# Patient Record
Sex: MALE | Race: WHITE | HISPANIC OR LATINO | Employment: FULL TIME | URBAN - METROPOLITAN AREA
[De-identification: names, ages, dates, MRNs, and addresses within clinical notes are randomized per-mention and may not be internally consistent; named-entity substitution may affect disease eponyms.]

---

## 2024-09-23 ENCOUNTER — APPOINTMENT (OUTPATIENT)
Dept: CT IMAGING | Facility: HOSPITAL | Age: 54
End: 2024-09-23
Payer: COMMERCIAL

## 2024-09-23 ENCOUNTER — HOSPITAL ENCOUNTER (INPATIENT)
Facility: HOSPITAL | Age: 54
LOS: 2 days | Discharge: HOME | End: 2024-09-25
Attending: EMERGENCY MEDICINE | Admitting: HOSPITALIST
Payer: COMMERCIAL

## 2024-09-23 DIAGNOSIS — E87.6 HYPOKALEMIA: ICD-10-CM

## 2024-09-23 DIAGNOSIS — Z91.89 AT RISK FOR OBSTRUCTIVE SLEEP APNEA: ICD-10-CM

## 2024-09-23 DIAGNOSIS — R55 NEAR SYNCOPE: Primary | ICD-10-CM

## 2024-09-23 DIAGNOSIS — E87.1 HYPONATREMIA: ICD-10-CM

## 2024-09-23 LAB
ADV 40+41 DNA STL QL NAA+NON-PROBE: NOT DETECTED
ALBUMIN SERPL-MCNC: 3.6 G/DL (ref 3.5–5.2)
ALBUMIN SERPL-MCNC: 3.9 G/DL (ref 3.5–5.2)
ALBUMIN/GLOB SERPL: 1.7 G/DL
ALP SERPL-CCNC: 53 U/L (ref 39–117)
ALT SERPL W P-5'-P-CCNC: 20 U/L (ref 1–41)
ANION GAP SERPL CALCULATED.3IONS-SCNC: 11 MMOL/L (ref 5–15)
ANION GAP SERPL CALCULATED.3IONS-SCNC: 9.7 MMOL/L (ref 5–15)
AST SERPL-CCNC: 27 U/L (ref 1–40)
ASTRO TYP 1-8 RNA STL QL NAA+NON-PROBE: NOT DETECTED
BASOPHILS # BLD AUTO: 0.06 10*3/MM3 (ref 0–0.2)
BASOPHILS NFR BLD AUTO: 0.6 % (ref 0–1.5)
BILIRUB SERPL-MCNC: 0.6 MG/DL (ref 0–1.2)
BILIRUB UR QL STRIP: NEGATIVE
BUN SERPL-MCNC: 14 MG/DL (ref 6–20)
BUN SERPL-MCNC: 14 MG/DL (ref 6–20)
BUN/CREAT SERPL: 11.6 (ref 7–25)
BUN/CREAT SERPL: 8.7 (ref 7–25)
C CAYETANENSIS DNA STL QL NAA+NON-PROBE: NOT DETECTED
C COLI+JEJ+UPSA DNA STL QL NAA+NON-PROBE: NOT DETECTED
CALCIUM SPEC-SCNC: 8.6 MG/DL (ref 8.6–10.5)
CALCIUM SPEC-SCNC: 9 MG/DL (ref 8.6–10.5)
CHLORIDE SERPL-SCNC: 82 MMOL/L (ref 98–107)
CHLORIDE SERPL-SCNC: 85 MMOL/L (ref 98–107)
CHLORIDE UR-SCNC: 24 MMOL/L
CLARITY UR: CLEAR
CO2 SERPL-SCNC: 26 MMOL/L (ref 22–29)
CO2 SERPL-SCNC: 27.3 MMOL/L (ref 22–29)
COLOR UR: YELLOW
CORTIS SERPL-MCNC: 17.3 MCG/DL
CREAT SERPL-MCNC: 1.21 MG/DL (ref 0.76–1.27)
CREAT SERPL-MCNC: 1.61 MG/DL (ref 0.76–1.27)
CREAT UR-MCNC: 31.4 MG/DL
CRYPTOSP DNA STL QL NAA+NON-PROBE: NOT DETECTED
DEPRECATED RDW RBC AUTO: 43 FL (ref 37–54)
E HISTOLYT DNA STL QL NAA+NON-PROBE: NOT DETECTED
EAEC PAA PLAS AGGR+AATA ST NAA+NON-PRB: NOT DETECTED
EC STX1+STX2 GENES STL QL NAA+NON-PROBE: NOT DETECTED
EGFRCR SERPLBLD CKD-EPI 2021: 49.9 ML/MIN/1.73
EGFRCR SERPLBLD CKD-EPI 2021: 70.3 ML/MIN/1.73
EOSINOPHIL # BLD AUTO: 0.09 10*3/MM3 (ref 0–0.4)
EOSINOPHIL NFR BLD AUTO: 0.9 % (ref 0.3–6.2)
EPEC EAE GENE STL QL NAA+NON-PROBE: NOT DETECTED
ERYTHROCYTE [DISTWIDTH] IN BLOOD BY AUTOMATED COUNT: 12.6 % (ref 12.3–15.4)
ETEC LTA+ST1A+ST1B TOX ST NAA+NON-PROBE: NOT DETECTED
G LAMBLIA DNA STL QL NAA+NON-PROBE: NOT DETECTED
GLOBULIN UR ELPH-MCNC: 2.1 GM/DL
GLUCOSE SERPL-MCNC: 105 MG/DL (ref 65–99)
GLUCOSE SERPL-MCNC: 117 MG/DL (ref 65–99)
GLUCOSE UR STRIP-MCNC: NEGATIVE MG/DL
HCT VFR BLD AUTO: 43.8 % (ref 37.5–51)
HGB BLD-MCNC: 15.2 G/DL (ref 13–17.7)
HGB UR QL STRIP.AUTO: NEGATIVE
IMM GRANULOCYTES # BLD AUTO: 0.04 10*3/MM3 (ref 0–0.05)
IMM GRANULOCYTES NFR BLD AUTO: 0.4 % (ref 0–0.5)
KETONES UR QL STRIP: NEGATIVE
LEUKOCYTE ESTERASE UR QL STRIP.AUTO: NEGATIVE
LYMPHOCYTES # BLD AUTO: 1.21 10*3/MM3 (ref 0.7–3.1)
LYMPHOCYTES NFR BLD AUTO: 12.2 % (ref 19.6–45.3)
MAGNESIUM SERPL-MCNC: 2.2 MG/DL (ref 1.6–2.6)
MCH RBC QN AUTO: 32.1 PG (ref 26.6–33)
MCHC RBC AUTO-ENTMCNC: 34.7 G/DL (ref 31.5–35.7)
MCV RBC AUTO: 92.4 FL (ref 79–97)
MONOCYTES # BLD AUTO: 0.95 10*3/MM3 (ref 0.1–0.9)
MONOCYTES NFR BLD AUTO: 9.6 % (ref 5–12)
NEUTROPHILS NFR BLD AUTO: 7.59 10*3/MM3 (ref 1.7–7)
NEUTROPHILS NFR BLD AUTO: 76.3 % (ref 42.7–76)
NITRITE UR QL STRIP: NEGATIVE
NOROVIRUS GI+II RNA STL QL NAA+NON-PROBE: NOT DETECTED
NRBC BLD AUTO-RTO: 0 /100 WBC (ref 0–0.2)
OSMOLALITY SERPL: 253 MOSM/KG (ref 275–300)
OSMOLALITY UR: 143 MOSM/KG
P SHIGELLOIDES DNA STL QL NAA+NON-PROBE: NOT DETECTED
PH UR STRIP.AUTO: 7.5 [PH] (ref 5–8)
PHOSPHATE SERPL-MCNC: 3.3 MG/DL (ref 2.5–4.5)
PHOSPHATE SERPL-MCNC: 3.4 MG/DL (ref 2.5–4.5)
PLATELET # BLD AUTO: 226 10*3/MM3 (ref 140–450)
PMV BLD AUTO: 9.2 FL (ref 6–12)
POTASSIUM SERPL-SCNC: 2.7 MMOL/L (ref 3.5–5.2)
POTASSIUM SERPL-SCNC: 3 MMOL/L (ref 3.5–5.2)
POTASSIUM UR-SCNC: 16.1 MMOL/L
PROT ?TM UR-MCNC: 5.8 MG/DL
PROT SERPL-MCNC: 5.7 G/DL (ref 6–8.5)
PROT UR QL STRIP: NEGATIVE
PROT/CREAT UR: 184.7 MG/G CREA (ref 0–200)
QT INTERVAL: 434 MS
QTC INTERVAL: 485 MS
RBC # BLD AUTO: 4.74 10*6/MM3 (ref 4.14–5.8)
RVA RNA STL QL NAA+NON-PROBE: NOT DETECTED
S ENT+BONG DNA STL QL NAA+NON-PROBE: NOT DETECTED
SAPO I+II+IV+V RNA STL QL NAA+NON-PROBE: NOT DETECTED
SHIGELLA SP+EIEC IPAH ST NAA+NON-PROBE: NOT DETECTED
SODIUM SERPL-SCNC: 119 MMOL/L (ref 136–145)
SODIUM SERPL-SCNC: 122 MMOL/L (ref 136–145)
SODIUM UR-SCNC: 22 MMOL/L
SP GR UR STRIP: 1.01 (ref 1–1.03)
TROPONIN T SERPL HS-MCNC: 23 NG/L
TSH SERPL DL<=0.05 MIU/L-ACNC: 2.37 UIU/ML (ref 0.27–4.2)
URATE SERPL-MCNC: 4.6 MG/DL (ref 3.4–7)
UROBILINOGEN UR QL STRIP: NORMAL
V CHOL+PARA+VUL DNA STL QL NAA+NON-PROBE: NOT DETECTED
V CHOLERAE DNA STL QL NAA+NON-PROBE: NOT DETECTED
WBC NRBC COR # BLD AUTO: 9.94 10*3/MM3 (ref 3.4–10.8)
Y ENTEROCOL DNA STL QL NAA+NON-PROBE: NOT DETECTED

## 2024-09-23 PROCEDURE — 25810000003 SODIUM CHLORIDE 0.9 % SOLUTION: Performed by: EMERGENCY MEDICINE

## 2024-09-23 PROCEDURE — 74177 CT ABD & PELVIS W/CONTRAST: CPT

## 2024-09-23 PROCEDURE — 84133 ASSAY OF URINE POTASSIUM: CPT | Performed by: INTERNAL MEDICINE

## 2024-09-23 PROCEDURE — 93005 ELECTROCARDIOGRAM TRACING: CPT | Performed by: EMERGENCY MEDICINE

## 2024-09-23 PROCEDURE — 82533 TOTAL CORTISOL: CPT | Performed by: INTERNAL MEDICINE

## 2024-09-23 PROCEDURE — 84156 ASSAY OF PROTEIN URINE: CPT | Performed by: INTERNAL MEDICINE

## 2024-09-23 PROCEDURE — 36415 COLL VENOUS BLD VENIPUNCTURE: CPT | Performed by: INTERNAL MEDICINE

## 2024-09-23 PROCEDURE — 80050 GENERAL HEALTH PANEL: CPT | Performed by: EMERGENCY MEDICINE

## 2024-09-23 PROCEDURE — 81003 URINALYSIS AUTO W/O SCOPE: CPT | Performed by: INTERNAL MEDICINE

## 2024-09-23 PROCEDURE — 93010 ELECTROCARDIOGRAM REPORT: CPT | Performed by: INTERNAL MEDICINE

## 2024-09-23 PROCEDURE — 83930 ASSAY OF BLOOD OSMOLALITY: CPT | Performed by: INTERNAL MEDICINE

## 2024-09-23 PROCEDURE — 70450 CT HEAD/BRAIN W/O DYE: CPT

## 2024-09-23 PROCEDURE — 84300 ASSAY OF URINE SODIUM: CPT | Performed by: INTERNAL MEDICINE

## 2024-09-23 PROCEDURE — 87507 IADNA-DNA/RNA PROBE TQ 12-25: CPT | Performed by: EMERGENCY MEDICINE

## 2024-09-23 PROCEDURE — 84484 ASSAY OF TROPONIN QUANT: CPT | Performed by: EMERGENCY MEDICINE

## 2024-09-23 PROCEDURE — 83935 ASSAY OF URINE OSMOLALITY: CPT | Performed by: INTERNAL MEDICINE

## 2024-09-23 PROCEDURE — 80069 RENAL FUNCTION PANEL: CPT | Performed by: INTERNAL MEDICINE

## 2024-09-23 PROCEDURE — 84100 ASSAY OF PHOSPHORUS: CPT | Performed by: HOSPITALIST

## 2024-09-23 PROCEDURE — 25510000001 IOPAMIDOL 61 % SOLUTION: Performed by: EMERGENCY MEDICINE

## 2024-09-23 PROCEDURE — 83735 ASSAY OF MAGNESIUM: CPT | Performed by: EMERGENCY MEDICINE

## 2024-09-23 PROCEDURE — 99285 EMERGENCY DEPT VISIT HI MDM: CPT

## 2024-09-23 PROCEDURE — 84550 ASSAY OF BLOOD/URIC ACID: CPT | Performed by: INTERNAL MEDICINE

## 2024-09-23 PROCEDURE — 82570 ASSAY OF URINE CREATININE: CPT | Performed by: INTERNAL MEDICINE

## 2024-09-23 PROCEDURE — 82436 ASSAY OF URINE CHLORIDE: CPT | Performed by: INTERNAL MEDICINE

## 2024-09-23 RX ORDER — SODIUM CHLORIDE 0.9 % (FLUSH) 0.9 %
10 SYRINGE (ML) INJECTION EVERY 12 HOURS SCHEDULED
Status: DISCONTINUED | OUTPATIENT
Start: 2024-09-23 | End: 2024-09-25 | Stop reason: HOSPADM

## 2024-09-23 RX ORDER — POTASSIUM CHLORIDE 750 MG/1
40 TABLET, FILM COATED, EXTENDED RELEASE ORAL EVERY 4 HOURS
Status: COMPLETED | OUTPATIENT
Start: 2024-09-23 | End: 2024-09-23

## 2024-09-23 RX ORDER — SODIUM CHLORIDE 0.9 % (FLUSH) 0.9 %
10 SYRINGE (ML) INJECTION AS NEEDED
Status: DISCONTINUED | OUTPATIENT
Start: 2024-09-23 | End: 2024-09-25 | Stop reason: HOSPADM

## 2024-09-23 RX ORDER — CLONIDINE HYDROCHLORIDE 0.1 MG/1
0.1 TABLET ORAL 2 TIMES DAILY
COMMUNITY
End: 2024-09-25 | Stop reason: HOSPADM

## 2024-09-23 RX ORDER — SODIUM CHLORIDE 9 MG/ML
40 INJECTION, SOLUTION INTRAVENOUS AS NEEDED
Status: DISCONTINUED | OUTPATIENT
Start: 2024-09-23 | End: 2024-09-25 | Stop reason: HOSPADM

## 2024-09-23 RX ORDER — LISINOPRIL 20 MG/1
20 TABLET ORAL DAILY
Status: DISCONTINUED | OUTPATIENT
Start: 2024-09-23 | End: 2024-09-23

## 2024-09-23 RX ORDER — NITROGLYCERIN 0.4 MG/1
0.4 TABLET SUBLINGUAL
Status: DISCONTINUED | OUTPATIENT
Start: 2024-09-23 | End: 2024-09-25 | Stop reason: HOSPADM

## 2024-09-23 RX ORDER — ACETAMINOPHEN 160 MG/5ML
650 SOLUTION ORAL EVERY 4 HOURS PRN
Status: DISCONTINUED | OUTPATIENT
Start: 2024-09-23 | End: 2024-09-25 | Stop reason: HOSPADM

## 2024-09-23 RX ORDER — ONDANSETRON 2 MG/ML
4 INJECTION INTRAMUSCULAR; INTRAVENOUS EVERY 6 HOURS PRN
Status: DISCONTINUED | OUTPATIENT
Start: 2024-09-23 | End: 2024-09-25 | Stop reason: HOSPADM

## 2024-09-23 RX ORDER — ACETAMINOPHEN 650 MG/1
650 SUPPOSITORY RECTAL EVERY 4 HOURS PRN
Status: DISCONTINUED | OUTPATIENT
Start: 2024-09-23 | End: 2024-09-25 | Stop reason: HOSPADM

## 2024-09-23 RX ORDER — IOPAMIDOL 612 MG/ML
100 INJECTION, SOLUTION INTRAVASCULAR
Status: COMPLETED | OUTPATIENT
Start: 2024-09-23 | End: 2024-09-23

## 2024-09-23 RX ORDER — NICOTINE 21 MG/24HR
1 PATCH, TRANSDERMAL 24 HOURS TRANSDERMAL
Status: DISCONTINUED | OUTPATIENT
Start: 2024-09-23 | End: 2024-09-25 | Stop reason: HOSPADM

## 2024-09-23 RX ORDER — CARVEDILOL 12.5 MG/1
12.5 TABLET ORAL 2 TIMES DAILY
Status: DISCONTINUED | OUTPATIENT
Start: 2024-09-23 | End: 2024-09-25 | Stop reason: HOSPADM

## 2024-09-23 RX ORDER — CARVEDILOL 12.5 MG/1
12.5 TABLET ORAL 2 TIMES DAILY
COMMUNITY

## 2024-09-23 RX ORDER — CLONIDINE HYDROCHLORIDE 0.1 MG/1
0.1 TABLET ORAL 2 TIMES DAILY
Status: DISCONTINUED | OUTPATIENT
Start: 2024-09-23 | End: 2024-09-24

## 2024-09-23 RX ORDER — ONDANSETRON 4 MG/1
4 TABLET, ORALLY DISINTEGRATING ORAL EVERY 6 HOURS PRN
Status: DISCONTINUED | OUTPATIENT
Start: 2024-09-23 | End: 2024-09-25 | Stop reason: HOSPADM

## 2024-09-23 RX ORDER — LISINOPRIL 20 MG/1
20 TABLET ORAL DAILY
COMMUNITY
End: 2024-09-25 | Stop reason: HOSPADM

## 2024-09-23 RX ORDER — CHLORTHALIDONE 25 MG/1
25 TABLET ORAL DAILY
COMMUNITY
End: 2024-09-25 | Stop reason: HOSPADM

## 2024-09-23 RX ORDER — ACETAMINOPHEN 325 MG/1
650 TABLET ORAL EVERY 4 HOURS PRN
Status: DISCONTINUED | OUTPATIENT
Start: 2024-09-23 | End: 2024-09-25 | Stop reason: HOSPADM

## 2024-09-23 RX ORDER — POTASSIUM CHLORIDE 1500 MG/1
20 TABLET, EXTENDED RELEASE ORAL 2 TIMES DAILY
COMMUNITY
End: 2024-09-25 | Stop reason: HOSPADM

## 2024-09-23 RX ORDER — SODIUM CHLORIDE 9 MG/ML
75 INJECTION, SOLUTION INTRAVENOUS CONTINUOUS
Status: DISCONTINUED | OUTPATIENT
Start: 2024-09-23 | End: 2024-09-24

## 2024-09-23 RX ADMIN — POTASSIUM CHLORIDE 40 MEQ: 750 TABLET, EXTENDED RELEASE ORAL at 13:04

## 2024-09-23 RX ADMIN — NICOTINE 1 PATCH: 14 PATCH TRANSDERMAL at 21:15

## 2024-09-23 RX ADMIN — POTASSIUM CHLORIDE 40 MEQ: 750 TABLET, EXTENDED RELEASE ORAL at 21:15

## 2024-09-23 RX ADMIN — IOPAMIDOL 85 ML: 612 INJECTION, SOLUTION INTRAVENOUS at 12:42

## 2024-09-23 RX ADMIN — CARVEDILOL 12.5 MG: 12.5 TABLET, FILM COATED ORAL at 21:16

## 2024-09-23 RX ADMIN — ACETAMINOPHEN 325MG 650 MG: 325 TABLET ORAL at 18:23

## 2024-09-23 RX ADMIN — POTASSIUM CHLORIDE 40 MEQ: 750 TABLET, EXTENDED RELEASE ORAL at 18:22

## 2024-09-23 RX ADMIN — SODIUM CHLORIDE 1000 ML: 9 INJECTION, SOLUTION INTRAVENOUS at 12:12

## 2024-09-23 RX ADMIN — CLONIDINE HYDROCHLORIDE 0.1 MG: 0.1 TABLET ORAL at 21:15

## 2024-09-23 RX ADMIN — SODIUM CHLORIDE 75 ML/HR: 9 INJECTION, SOLUTION INTRAVENOUS at 18:26

## 2024-09-23 RX ADMIN — Medication 10 ML: at 21:16

## 2024-09-23 NOTE — H&P
HISTORY AND PHYSICAL   Psychiatric        Patient Identification:  Name: Vadim Elder  Age: 54 y.o.  Sex: male  :  1970  MRN: [id number]                     Primary Care Physician: Shiva Gutierrez MD    Chief Complaint: Lightheaded    History of Present Illness:   Pleasant 54-year-old gentleman with history of hypertension for which she has been taking Hygroton apparently for a number of years now.  He works in air conditioning.  He was feeling lightheaded at work today and EMS was called.  He was hypotensive on presentation.  He does note that he has been having loose stools and he had increased his oral intake.  Workup revealed significant hyponatremia as well as hypokalemia.  Available records indicate that he has had chronic hypokalemia though not to this severity.  In addition there have been episodes of hyponatremia in the past.  Presently feeling better after IV fluids.        Past Medical History:  History reviewed. No pertinent past medical history.  Past Surgical History:  History reviewed. No pertinent surgical history.   Home Meds:  Medications Prior to Admission   Medication Sig Dispense Refill Last Dose    carvedilol (COREG) 12.5 MG tablet Take 1 tablet by mouth 2 (Two) Times a Day.       chlorthalidone (HYGROTON) 25 MG tablet Take 1 tablet by mouth Daily.       cloNIDine (CATAPRES) 0.1 MG tablet Take 1 tablet by mouth 2 (Two) Times a Day.       lisinopril (PRINIVIL,ZESTRIL) 20 MG tablet Take 1 tablet by mouth Daily.       potassium chloride ER (K-TAB) 20 MEQ tablet controlled-release ER tablet Take 1 tablet by mouth 2 (Two) Times a Day.          Allergies:  No Known Allergies  Immunizations:    There is no immunization history on file for this patient.  Social History:   Social History     Social History Narrative    Not on file     Social History     Tobacco Use    Smoking status: Every Day     Current packs/day: 0.50     Average packs/day: 0.5 packs/day for 52.7 years (26.4  ttl pk-yrs)     Types: Cigarettes     Start date:     Smokeless tobacco: Never   Substance Use Topics    Alcohol use: Yes     Alcohol/week: 12.0 standard drinks of alcohol     Types: 12 Cans of beer per week     Comment: 2 beer/day     Family History:  History reviewed. No pertinent family history.     Review of Systems  Review of Systems   Constitutional:         As per history of present illness       Objective:  T Max 24 hrs: Temp (24hrs), Av.9 °F (36.6 °C), Min:97.3 °F (36.3 °C), Max:98.5 °F (36.9 °C)    Vitals Ranges:   Temp:  [97.3 °F (36.3 °C)-98.5 °F (36.9 °C)] 97.3 °F (36.3 °C)  Heart Rate:  [66-78] 74  Resp:  [16] 16  BP: (105-167)/(55-94) 167/85      Exam:  Physical Exam  Constitutional:       General: He is not in acute distress.     Appearance: Normal appearance. He is normal weight. He is not ill-appearing, toxic-appearing or diaphoretic.   HENT:      Head: Normocephalic and atraumatic.      Right Ear: External ear normal.      Left Ear: External ear normal.      Nose: Nose normal.      Mouth/Throat:      Mouth: Mucous membranes are moist.   Eyes:      General: No scleral icterus.        Right eye: No discharge.         Left eye: No discharge.      Extraocular Movements: Extraocular movements intact.      Conjunctiva/sclera: Conjunctivae normal.   Cardiovascular:      Rate and Rhythm: Normal rate and regular rhythm.      Heart sounds:      No friction rub. No gallop.   Pulmonary:      Effort: Pulmonary effort is normal.      Breath sounds: Normal breath sounds.   Abdominal:      General: Abdomen is flat. Bowel sounds are normal. There is no distension.      Palpations: Abdomen is soft. There is no mass.      Tenderness: There is no abdominal tenderness. There is no guarding or rebound.   Musculoskeletal:      Cervical back: Neck supple.      Right lower leg: No edema.      Left lower leg: No edema.   Skin:     General: Skin is warm and dry.   Neurological:      General: No focal deficit  present.      Mental Status: He is alert and oriented to person, place, and time.   Psychiatric:         Mood and Affect: Mood normal.         Behavior: Behavior normal.         Thought Content: Thought content normal.         Judgment: Judgment normal.         Data Review:  All labs and radiology reviewed.    Assessment:  Hyponatremia: Associated with the use of thiazide diuretics as well as increased free fluid intake.  Nephrology input appreciated.  Continue hydration and hold Hygroton.  Recommend looking to change this to a different antihypertensive.  Hypokalemia: Continue replacement protocol.  Keep Hygroton on hold.  JEFFREY/dehydration: Already showing good response to IV hydration.  Hold Hygroton.  Continue hydration.  Hypertension: Resume home meds with exception of Hygroton.  Lightheadedness: Secondary to hypotension secondary to antihypertensives and dehydration.  Resolved.  Recent history of loose stools: Monitor.  If persist consider GI panel.      Plan:  Please see above.  Discussed with ER provider.  Considering the size of his bladder on CT scan will also check postvoid residual.    Ralph Dela Cruz MD  9/23/2024  17:57 EDT    EMR Dragon/Transcription disclaimer:   Much of this encounter note is an electronic transcription/translation of spoken language to printed text. The electronic translation of spoken language may permit erroneous, or at times, nonsensical words or phrases to be inadvertently transcribed; Although I have reviewed the note for such errors, some may still exist.

## 2024-09-23 NOTE — ED NOTES
..Nursing report ED to floor  Vadim Elder  54 y.o.  male    HPI :  HPI (Adult)  Stated Reason for Visit: syncopal episode at work, denies hitting head, no thinners, low BP upon ems arrival 80s systolic  History Obtained From: EMS    Chief Complaint  Chief Complaint   Patient presents with    Syncope       Admitting doctor:   Ralph Dela Cruz MD    Admitting diagnosis:   The primary encounter diagnosis was Near syncope. Diagnoses of Hyponatremia and Hypokalemia were also pertinent to this visit.    Code status:   Current Code Status       Date Active Code Status Order ID Comments User Context       Not on file            Allergies:   Patient has no known allergies.    Isolation:   No active isolations    Intake and Output    Intake/Output Summary (Last 24 hours) at 9/23/2024 1443  Last data filed at 9/23/2024 1443  Gross per 24 hour   Intake 1700 ml   Output 400 ml   Net 1300 ml       Weight:       09/23/24  1259   Weight: 92.5 kg (204 lb)       Most recent vitals:   Vitals:    09/23/24 1308 09/23/24 1309 09/23/24 1331 09/23/24 1431   BP: 143/86  155/86 155/94   BP Location:       Pulse: 73 73 66 78   Resp:       Temp:       TempSrc:       SpO2: 99% 98% 97% 94%   Weight:       Height:           Active LDAs/IV Access:   Lines, Drains & Airways       Active LDAs       Name Placement date Placement time Site Days    Peripheral IV 09/23/24 1044 Right Antecubital 09/23/24  1044  Antecubital  less than 1                    Labs (abnormal labs have a star):   Labs Reviewed   COMPREHENSIVE METABOLIC PANEL - Abnormal; Notable for the following components:       Result Value    Glucose 117 (*)     Creatinine 1.61 (*)     Sodium 119 (*)     Potassium 2.7 (*)     Chloride 82 (*)     Total Protein 5.7 (*)     eGFR 49.9 (*)     All other components within normal limits    Narrative:     GFR Normal >60  Chronic Kidney Disease <60  Kidney Failure <15     SINGLE HS TROPONIN T - Abnormal; Notable for the following components:     HS Troponin T 23 (*)     All other components within normal limits    Narrative:     High Sensitive Troponin T Reference Range:  <14.0 ng/L- Negative Female for AMI  <22.0 ng/L- Negative Male for AMI  >=14 - Abnormal Female indicating possible myocardial injury.  >=22 - Abnormal Male indicating possible myocardial injury.   Clinicians would have to utilize clinical acumen, EKG, Troponin, and serial changes to determine if it is an Acute Myocardial Infarction or myocardial injury due to an underlying chronic condition.        CBC WITH AUTO DIFFERENTIAL - Abnormal; Notable for the following components:    Neutrophil % 76.3 (*)     Lymphocyte % 12.2 (*)     Neutrophils, Absolute 7.59 (*)     Monocytes, Absolute 0.95 (*)     All other components within normal limits   GASTROINTESTINAL PANEL, PCR (PREFERRED) DOES NOT INCLUDE CDIFF - Normal    Narrative:     If Aeromonas, Staphylococcus aureus or Bacillus cereus are suspected, please order [id number]: Stool Culture, Aeromonas, S aureus, B Cereus.   MAGNESIUM - Normal   TSH RFX ON ABNORMAL TO FREE T4 - Normal   URIC ACID - Normal   PHOSPHORUS - Normal   CORTISOL    Narrative:     Cortisol Reference Ranges:    Cortisol 6AM - 10AM Range: 6.02-18.40 mcg/dl  Cortisol 4PM - 8PM Range: 2.68-10.50 mcg/dl      Results may be falsely increased if patient taking Biotin.     RENAL FUNCTION PANEL   OSMOLALITY, SERUM   SODIUM, URINE, RANDOM   POTASSIUM, URINE, RANDOM   CHLORIDE, URINE, RANDOM   OSMOLALITY, URINE   CBC AND DIFFERENTIAL    Narrative:     The following orders were created for panel order CBC & Differential.  Procedure                               Abnormality         Status                     ---------                               -----------         ------                     CBC Auto Differential[[id number]]        Abnormal            Final result                 Please view results for these tests on the individual orders.       EKG:   ECG 12 Lead Syncope   Preliminary  Result   HEART RATE=75  bpm   RR Tkjaxzor=246  ms   NJ Pcvtgson=091  ms   P Horizontal Axis=-19  deg   P Front Axis=45  deg   QRSD Qajoxufq=700  ms   QT Pmrxlyyf=356  ms   VUzD=657  ms   QRS Axis=55  deg   T Wave Axis=167  deg   - ABNORMAL ECG -   Sinus rhythm   Prolonged NJ interval   Probable left atrial enlargement   Repol abnrm suggests ischemia, lateral leads   Date and Time of Study:2024-09-23 12:04:03      Telemetry Scan   Final Result      Telemetry Scan   Final Result          Meds given in ED:   Medications   Potassium Replacement - Follow Nurse / BPA Driven Protocol (has no administration in time range)   potassium chloride (K-DUR,KLOR-CON) ER tablet 40 mEq (40 mEq Oral Given 9/23/24 1304)   sodium chloride 0.9 % bolus 1,000 mL (0 mL Intravenous Stopped 9/23/24 1329)   iopamidol (ISOVUE-300) 61 % injection 100 mL (85 mL Intravenous Given by Other 9/23/24 1242)       Imaging results:  CT Abdomen Pelvis With Contrast    Result Date: 9/23/2024   1. No acute findings identified in the abdomen or pelvis. 2. Moderate bladder distention. Clinical correlation. 3. Nonobstructing nephrolithiasis.  This report was finalized on 9/23/2024 1:19 PM by Dr. Juan Vasquez M.D on Workstation: [id number]      CT Head Without Contrast    Result Date: 9/23/2024  No acute process is identified. Further evaluation could be performed with a MRI examination of the brain as indicated.      Radiation dose reduction techniques were utilized, including automated exposure modulation based on body size.  This report was finalized on 9/23/2024 1:00 PM by Dr. Fady Merchant M.D on Workstation: [id number]       Ambulatory status:   - up with assist x1    Social issues:   Social History     Socioeconomic History    Marital status:        Peripheral Neurovascular  Peripheral Neurovascular (Adult)  Peripheral Neurovascular WDL: WDL    Neuro Cognitive  Neuro Cognitive (Adult)  Cognitive/Neuro/Behavioral WDL: WDL  Orientation:  oriented x 4  Speech: logical, clear  Pupils  Pupil PERRLA: yes    Learning  Learning Assessment (Adult)  Learning Readiness and Ability: language barrier identified  :  requested    Respiratory  Respiratory WDL  Respiratory WDL: WDL    Abdominal Pain       Pain Assessments  Pain (Adult)  (0-10) Pain Rating: Rest: 0  (0-10) Pain Rating: Activity: 0    NIH Stroke Scale       Joycelyn West RN  09/23/24 14:43 EDT

## 2024-09-23 NOTE — PROGRESS NOTES
Clinical Pharmacy Services: Medication History    Vadim Elder is a 54 y.o. male presenting to [facility] for   Chief Complaint   Patient presents with    Syncope       He  has no past medical history on file.    Allergies as of 09/23/2024    (No Known Allergies)       Medication information was obtained from: Patient & Pharmacy   Pharmacy and Phone Number: [facility] [phone]    Prior to Admission Medications       Prescriptions Last Dose Informant Patient Reported? Taking?    carvedilol (COREG) 12.5 MG tablet  Self, Pharmacy Yes Yes    Take 1 tablet by mouth 2 (Two) Times a Day.    chlorthalidone (HYGROTON) 25 MG tablet  Self, Pharmacy Yes Yes    Take 1 tablet by mouth Daily.    cloNIDine (CATAPRES) 0.1 MG tablet  Self, Pharmacy Yes Yes    Take 1 tablet by mouth 2 (Two) Times a Day.    lisinopril (PRINIVIL,ZESTRIL) 20 MG tablet  Self, Pharmacy Yes Yes    Take 1 tablet by mouth Daily.    potassium chloride ER (K-TAB) 20 MEQ tablet controlled-release ER tablet  Self, Pharmacy Yes Yes    Take 1 tablet by mouth 2 (Two) Times a Day.              Medication notes:     This medication list is complete to the best of my knowledge as of 9/23/2024    Please call if questions.    Narayan Beckford  Medication History Technician   [phone]    9/23/2024 13:23 EDT

## 2024-09-23 NOTE — CASE MANAGEMENT/SOCIAL WORK
Discharge Planning Assessment  [facility]     Patient Name: Vadim Elder  MRN: [id number]  Today's Date: 9/23/2024    Admit Date: 9/23/2024    Plan: Home with wife   Discharge Needs Assessment       Row Name 09/23/24 1641       Living Environment    People in Home spouse    Name(s) of People in Home Wife Randi    Current Living Arrangements home    Potentially Unsafe Housing Conditions none    Primary Care Provided by self    Family Caregiver if Needed spouse    Family Caregiver Names Randi    Quality of Family Relationships involved;helpful    Able to Return to Prior Arrangements yes       Resource/Environmental Concerns    Resource/Environmental Concerns none       Transition Planning    Patient/Family Anticipates Transition to home with family    Patient/Family Anticipated Services at Transition none    Transportation Anticipated family or friend will provide       Discharge Needs Assessment    Readmission Within the Last 30 Days no previous admission in last 30 days    Equipment Currently Used at Home none    Concerns to be Addressed no discharge needs identified    Anticipated Changes Related to Illness none    Equipment Needed After Discharge none                   Discharge Plan       Row Name 09/23/24 1641       Plan    Plan Home with wife    Patient/Family in Agreement with Plan yes    Plan Comments Spoke to patient at bedside, introduced self and explained CCP role, face sheet and pharmacy information verified. Pt lives with his wife Randi in 1 level home with 2 SARMAD, he is IADL's, uses no medical equipment. He has no HH or SNF history, he plans home with wife to transport, no anticipated needs. CCP will follow -Thalia S.                  Continued Care and Services - Admitted Since 9/23/2024    No active coordination exists for this encounter.       Expected Discharge Date and Time       Expected Discharge Date Expected Discharge Time    Sep 25, 2024            Demographic Summary       Row Name 09/23/24 1640        General Information    Admission Type inpatient                   Functional Status       Row Name 09/23/24 1640       Functional Status    Usual Activity Tolerance excellent    Current Activity Tolerance good       Assessment of Health Literacy    Health Literacy Good       Functional Status, IADL    Medications independent    Meal Preparation independent    Housekeeping independent    Laundry independent    Shopping independent       Mental Status    General Appearance WDL WDL       Mental Status Summary    Recent Changes in Mental Status/Cognitive Functioning no changes                   Psychosocial    No documentation.                  Abuse/Neglect    No documentation.                  Legal       Row Name 09/23/24 1640       Financial/Legal    Who Manages Finances if Patient Unable wife Randi                   Substance Abuse    No documentation.                  Patient Forms    No documentation.                     Thalia Marie RN

## 2024-09-23 NOTE — ED PROVIDER NOTES
EMERGENCY DEPARTMENT ENCOUNTER    Room Number:  25/25  PCP: Provider, No Known  Historian: Patient      HPI:  Chief Complaint: Syncope  A complete HPI/ROS/PMH/PSH/SH/FH are unobtainable due to: Language barrier history obtained through   Context: Vadim Elder is a 54 y.o. male who presents to the ED c/o syncope.  Patient states he is never passed out before.  Patient was at work today and his vision got blurry.  Patient apparently went down.  Did not hit his head.  Patient's blood pressure initially was low better now.  Had no chest pain or shortness of breath.  No heart racing.  No abdominal pain.  No vomiting or diarrhea.  No fevers or chills.            PAST MEDICAL HISTORY  Active Ambulatory Problems     Diagnosis Date Noted    No Active Ambulatory Problems     Resolved Ambulatory Problems     Diagnosis Date Noted    No Resolved Ambulatory Problems     No Additional Past Medical History         PAST SURGICAL HISTORY  History reviewed. No pertinent surgical history.      FAMILY HISTORY  History reviewed. No pertinent family history.      SOCIAL HISTORY  Social History     Socioeconomic History    Marital status:    Substance and Sexual Activity    Alcohol use: Yes     Alcohol/week: 12.0 standard drinks of alcohol     Types: 12 Cans of beer per week     Comment: 2 beer/day         ALLERGIES  Patient has no known allergies.        REVIEW OF SYSTEMS  Review of Systems   Syncope      PHYSICAL EXAM  ED Triage Vitals   Temp Heart Rate Resp BP SpO2   09/23/24 1044 09/23/24 1044 09/23/24 1044 09/23/24 1044 09/23/24 1044   98.5 °F (36.9 °C) 71 16 105/55 95 %      Temp src Heart Rate Source Patient Position BP Location FiO2 (%)   09/23/24 1044 09/23/24 1044 -- 09/23/24 1105 --   Oral Monitor  Right arm        Physical Exam      GENERAL: no acute distress  HENT: nares patent  EYES: no scleral icterus  CV: regular rhythm, normal rate  RESPIRATORY: normal effort  ABDOMEN: soft  MUSCULOSKELETAL: no  deformity  NEURO: alert, moves all extremities, follows commands  PSYCH:  calm, cooperative  SKIN: warm, dry    Vital signs and nursing notes reviewed.          LAB RESULTS  Recent Results (from the past 24 hour(s))   Comprehensive Metabolic Panel    Collection Time: 09/23/24 11:11 AM    Specimen: Blood   Result Value Ref Range    Glucose 117 (H) 65 - 99 mg/dL    BUN 14 6 - 20 mg/dL    Creatinine 1.61 (H) 0.76 - 1.27 mg/dL    Sodium 119 (C) 136 - 145 mmol/L    Potassium 2.7 (L) 3.5 - 5.2 mmol/L    Chloride 82 (L) 98 - 107 mmol/L    CO2 27.3 22.0 - 29.0 mmol/L    Calcium 8.6 8.6 - 10.5 mg/dL    Total Protein 5.7 (L) 6.0 - 8.5 g/dL    Albumin 3.6 3.5 - 5.2 g/dL    ALT (SGPT) 20 1 - 41 U/L    AST (SGOT) 27 1 - 40 U/L    Alkaline Phosphatase 53 39 - 117 U/L    Total Bilirubin 0.6 0.0 - 1.2 mg/dL    Globulin 2.1 gm/dL    A/G Ratio 1.7 g/dL    BUN/Creatinine Ratio 8.7 7.0 - 25.0    Anion Gap 9.7 5.0 - 15.0 mmol/L    eGFR 49.9 (L) >60.0 mL/min/1.73   Single High Sensitivity Troponin T    Collection Time: 09/23/24 11:11 AM    Specimen: Blood   Result Value Ref Range    HS Troponin T 23 (H) <22 ng/L   CBC Auto Differential    Collection Time: 09/23/24 11:11 AM    Specimen: Blood   Result Value Ref Range    WBC 9.94 3.40 - 10.80 10*3/mm3    RBC 4.74 4.14 - 5.80 10*6/mm3    Hemoglobin 15.2 13.0 - 17.7 g/dL    Hematocrit 43.8 37.5 - 51.0 %    MCV 92.4 79.0 - 97.0 fL    MCH 32.1 26.6 - 33.0 pg    MCHC 34.7 31.5 - 35.7 g/dL    RDW 12.6 12.3 - 15.4 %    RDW-SD 43.0 37.0 - 54.0 fl    MPV 9.2 6.0 - 12.0 fL    Platelets 226 140 - 450 10*3/mm3    Neutrophil % 76.3 (H) 42.7 - 76.0 %    Lymphocyte % 12.2 (L) 19.6 - 45.3 %    Monocyte % 9.6 5.0 - 12.0 %    Eosinophil % 0.9 0.3 - 6.2 %    Basophil % 0.6 0.0 - 1.5 %    Immature Grans % 0.4 0.0 - 0.5 %    Neutrophils, Absolute 7.59 (H) 1.70 - 7.00 10*3/mm3    Lymphocytes, Absolute 1.21 0.70 - 3.10 10*3/mm3    Monocytes, Absolute 0.95 (H) 0.10 - 0.90 10*3/mm3    Eosinophils, Absolute 0.09  0.00 - 0.40 10*3/mm3    Basophils, Absolute 0.06 0.00 - 0.20 10*3/mm3    Immature Grans, Absolute 0.04 0.00 - 0.05 10*3/mm3    nRBC 0.0 0.0 - 0.2 /100 WBC   Magnesium    Collection Time: 09/23/24 11:11 AM    Specimen: Blood   Result Value Ref Range    Magnesium 2.2 1.6 - 2.6 mg/dL   TSH Rfx On Abnormal To Free T4    Collection Time: 09/23/24 11:11 AM    Specimen: Blood   Result Value Ref Range    TSH 2.370 0.270 - 4.200 uIU/mL   Uric Acid    Collection Time: 09/23/24 11:11 AM    Specimen: Blood   Result Value Ref Range    Uric Acid 4.6 3.4 - 7.0 mg/dL   Cortisol    Collection Time: 09/23/24 11:11 AM    Specimen: Blood   Result Value Ref Range    Cortisol 17.30   mcg/dL   Phosphorus    Collection Time: 09/23/24 11:11 AM    Specimen: Blood   Result Value Ref Range    Phosphorus 3.3 2.5 - 4.5 mg/dL   Gastrointestinal Panel, PCR - Stool, Per Rectum    Collection Time: 09/23/24 11:53 AM    Specimen: Per Rectum; Stool   Result Value Ref Range    Campylobacter Not Detected Not Detected    Plesiomonas shigelloides Not Detected Not Detected    Salmonella Not Detected Not Detected    Vibrio Not Detected Not Detected    Vibrio cholerae Not Detected Not Detected    Yersinia enterocolitica Not Detected Not Detected    Enteroaggregative E. coli (EAEC) Not Detected Not Detected    Enteropathogenic E. coli (EPEC) Not Detected Not Detected    Enterotoxigenic E. coli (ETEC) lt/st Not Detected Not Detected    Shiga-like toxin-producing E. coli (STEC) stx1/stx2 Not Detected Not Detected    Shigella/Enteroinvasive E. coli (EIEC) Not Detected Not Detected    Cryptosporidium Not Detected Not Detected    Cyclospora cayetanensis Not Detected Not Detected    Entamoeba histolytica Not Detected Not Detected    Giardia lamblia Not Detected Not Detected    Adenovirus F40/41 Not Detected Not Detected    Astrovirus Not Detected Not Detected    Norovirus GI/GII Not Detected Not Detected    Rotavirus A Not Detected Not Detected    Sapovirus (I, II,  IV or V) Not Detected Not Detected   ECG 12 Lead Syncope    Collection Time: 09/23/24 12:04 PM   Result Value Ref Range    QT Interval 434 ms    QTC Interval 485 ms       Ordered the above labs and reviewed the results.        RADIOLOGY  CT Abdomen Pelvis With Contrast    Result Date: 9/23/2024  CT ABDOMEN PELVIS W CONTRAST-  INDICATION: Abdominal pain, diarrhea  COMPARISON: None  TECHNIQUE: Routine CT abdomen and pelvis with IV contrast. Coronal and sagittal reformats. Radiation dose reduction techniques were utilized, including automated exposure control and exposure modulation based on body size.  FINDINGS:  Lung bases: Small amount of subsegmental atelectasis seen at the bases. Coronary artery atherosclerotic calcifications.  ABDOMEN: Small cyst seen in segment 2/3 of the left hepatic lobe. Normal gallbladder. No biliary ductal dilatation. Spleen is normal in size. No pancreatic mass or pancreatic ductal dilatation seen. No adrenal nodules. Fluid attenuating cyst seen in the inferior pole left kidney. No solid-appearing renal mass or hydronephrosis. Symmetric perinephric edema. Nonobstructing nephrolithiasis in the inferior pole right kidney, series 3, axial mage 71, measures 1 mm. Nonobstructing nephrolithiasis in the inferior pole left kidney, series 3, axial image 76, measures 3 mm.  Pelvis: Prostate is normal in size. Moderate bladder distention. No bladder calculus.  Bowel: No obstruction. Gas and fluid seen in the ascending colon. Normal appendix.  Abdominal wall: Rectus diastases.  Retroperitoneum: No lymphadenopathy.  Vasculature: No abdominal aortic aneurysm. Moderate aortoiliac atherosclerotic calcification. Atherosclerotic calcifications seen in the proximal SMA.  Osseous structures: Thoracic and lumbar spondylosis/degenerative disc disease with multilevel disc osteophyte complexes seen in the thoracic and lumbar spine. Lower lumbar facet degenerative arthropathy.       1. No acute findings identified  in the abdomen or pelvis. 2. Moderate bladder distention. Clinical correlation. 3. Nonobstructing nephrolithiasis.  This report was finalized on 9/23/2024 1:19 PM by Dr. Juan Vasquez M.D on Workstation: [id number]      CT Head Without Contrast    Result Date: 9/23/2024  CT HEAD WO CONTRAST-  HISTORY:  syncope  COMPARISON: None  FINDINGS: The brain ventricles are symmetrical. There is no evidence of hemorrhage, hydrocephalus or of abnormal extra-axial fluid. No focal area of decreased attenuation to suggest acute infarction is identified.      No acute process is identified. Further evaluation could be performed with a MRI examination of the brain as indicated.      Radiation dose reduction techniques were utilized, including automated exposure modulation based on body size.  This report was finalized on 9/23/2024 1:00 PM by Dr. Fady Merchant M.D on Workstation: [id number]       Ordered the above noted radiological studies.  CT abdomen independent interpreted by me and shows no evidence of obstruction          PROCEDURES  Procedures    EKG          EKG time: 1204  Rhythm/Rate: Normal sinus rhythm 75  P waves and IN: Normal P waves first-degree AV block  QRS, axis: Normal QRS  ST and T waves: Nonspecific ST-T wave    Interpreted Contemporaneously by me, independently viewed  No prior      MEDICATIONS GIVEN IN ER  Medications   Potassium Replacement - Follow Nurse / BPA Driven Protocol (has no administration in time range)   potassium chloride (K-DUR,KLOR-CON) ER tablet 40 mEq (40 mEq Oral Given 9/23/24 1304)   nicotine (NICODERM CQ) 14 MG/24HR patch 1 patch (has no administration in time range)   sodium chloride 0.9 % bolus 1,000 mL (0 mL Intravenous Stopped 9/23/24 1329)   iopamidol (ISOVUE-300) 61 % injection 100 mL (85 mL Intravenous Given by Other 9/23/24 1242)                   MEDICAL DECISION MAKING, PROGRESS, and CONSULTS    All labs have been independently reviewed by me.  All radiology studies have  been reviewed by me and I have also reviewed the radiology report.   EKG's independently viewed and interpreted by me.  Discussion below represents my analysis of pertinent findings related to patient's condition, differential diagnosis, treatment plan and final disposition.      Additional sources:  - Discussed/ obtained information from independent historians: None    - External (non-ED) record review: Epic reviewed and patient seen primary provider's office 9/10/2024 for hypertension    - Chronic or social conditions impacting care: None    - Shared decision making: None      Orders placed during this visit:  Orders Placed This Encounter   Procedures    Gastrointestinal Panel, PCR - Stool, Per Rectum    CT Head Without Contrast    CT Abdomen Pelvis With Contrast    Comprehensive Metabolic Panel    Single High Sensitivity Troponin T    CBC Auto Differential    Magnesium    Potassium    Renal Function Panel    Osmolality, Serum    Sodium, Urine, Random - Urine, Clean Catch    Potassium, Urine, Random - Urine, Clean Catch    Chloride, Urine, Random - Urine, Clean Catch    Osmolality, Urine - Urine, Clean Catch    TSH Rfx On Abnormal To Free T4    Uric Acid    Cortisol    Phosphorus    Nephrology (on -call MD unless specified)    [facility] (on-call MD unless specified) Details    Inpatient Nephrology Consult    ECG 12 Lead Syncope    Telemetry Scan    Telemetry Scan    Inpatient Admission    CBC & Differential         Additional orders considered but not ordered:  None        Differential diagnosis includes but is not limited to:    Hyponatremia hypokalemia dehydration      Independent interpretation of labs, radiology studies, and discussions with consultants:  ED Course as of 09/23/24 1450   Mon Sep 23, 2024   1414 14:14 EDT  Patient with near syncopal episode at work.  Has had multiple episodes of diarrhea here.  CT scan shows no evidence of colitis.  Patient is on diuretic.  Patient was found to be hyponatremic.  Was  given 1 L of normal saline.  Patient has been discussed with nephrology we will see patient in the hospital.  They have ordered several tests.  Patient discussed with Dr. Dela rCuz who will admit. []   1449 14:49 EDT  Patient wanting to go home.  I again convinced him to stay in the hospital.  Patient has been seen by nephrology.  Will be admitted.  Will give him nicotine patch. [SL]      ED Course User Index  [SL] Andrew Magdaleno MD                 DIAGNOSIS  Final diagnoses:   Near syncope   Hyponatremia   Hypokalemia         DISPOSITION  admit            Latest Documented Vital Signs:  As of 14:50 EDT  BP- 155/94 HR- 78 Temp- 98.5 °F (36.9 °C) (Oral) O2 sat- 94%              --    Please note that portions of this were completed with a voice recognition program.       Note Disclaimer: At [facility], we believe that sharing information builds trust and better relationships. You are receiving this note because you are receiving care at [facility] or recently visited. It is possible you will see health information before a provider has talked with you about it. This kind of information can be easy to misunderstand. To help you fully understand what it means for your health, we urge you to discuss this note with your provider.            Andrew Magdaleno MD  09/23/24 [id number]

## 2024-09-23 NOTE — NURSING NOTE
Pt came onto floor. Ipad  used. Educated on why he needs to stay in the hospital and agreed to stay for now. He wants to get up and walk around not lay in bed all the time.  Will take a shower when wife arrives. Answered the admissions questions and resting in his room.

## 2024-09-23 NOTE — CONSULTS
[facility] Consult Note      Patient Name: Vadim Elder  : 1970  MRN: [id number]  Primary Care Physician:  Provider, No Known  Referring Physician: No ref. provider found  Date of admission: 2024    Subjective     Reason for Consult: Acute kidney injury with hyponatremia    HPI:   Vadim Elder is a 54 y.o. 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  Diffuse osteoarthritis with chronic Advil and naproxen use usually 3-4 times a week     The patient states that he has been drinking a fair amount of fluids due to onset of loose bowel movements and lightheadedness he has been taking his medication instructed and this morning had an episode of lightheadedness EMS was called and brought to the emergency department where he was found hypotensive initial workup found to have acute hyponatremia in the 119.  Patient has initially managed with 1 L of NS .    Nephrology consultation requested for the management    Encounter held in [place]    Review of Systems:   14 point review of systems is otherwise negative except for mentioned above on HPI    Personal History     History reviewed. No pertinent past medical history.    History reviewed. No pertinent surgical history.    Family History: family history is not on file.    Social History:  reports current alcohol use of about 12.0 standard drinks of alcohol per week.    Home Medications:  Prior to Admission medications    Medication Sig Start Date End Date Taking? Authorizing Provider   carvedilol (COREG) 12.5 MG tablet Take 1 tablet by mouth 2 (Two) Times a Day.   Yes Provider, MD Suresh   chlorthalidone (HYGROTON) 25 MG tablet Take 1 tablet  by mouth Daily.   Yes Provider, MD Suresh   cloNIDine (CATAPRES) 0.1 MG tablet Take 1 tablet by mouth 2 (Two) Times a Day.   Yes Suresh Gomez MD   lisinopril (PRINIVIL,ZESTRIL) 20 MG tablet Take 1 tablet by mouth Daily.   Yes Jason, MD Suresh   potassium chloride ER (K-TAB) 20 MEQ tablet controlled-release ER tablet Take 1 tablet by mouth 2 (Two) Times a Day.   Yes Provider, MD Suresh       Allergies:  No Known Allergies    Objective     Vitals:   Temp:  [98.5 °F (36.9 °C)] 98.5 °F (36.9 °C)  Heart Rate:  [66-78] 78  Resp:  [16] 16  BP: (105-155)/(55-94) 155/94    Intake/Output Summary (Last 24 hours) at 9/23/2024 1507  Last data filed at 9/23/2024 1443  Gross per 24 hour   Intake 1700 ml   Output 400 ml   Net 1300 ml       Physical Exam:   Constitutional: Awake, alert, no acute distress.  HEENT: Sclera anicteric, no conjunctival injection  Neck: Supple, no thyromegaly, no lymphadenopathy, trachea at midline, no JVD  Respiratory: Clear to auscultation bilaterally, nonlabored respiration  Cardiovascular: RRR, no murmurs, no rubs or gallops, no carotid bruit  Gastrointestinal: Positive bowel sounds, abdomen is soft, nontender and nondistended  : No palpable bladder  Musculoskeletal: No edema, no clubbing or cyanosis  Psychiatric: Appropriate affect, cooperative  Neurologic: Oriented x3, moving all extremities, normal speech and mental status  Skin: Warm and dry       Scheduled Meds:     nicotine, 1 patch, Transdermal, Q24H  potassium chloride ER, 40 mEq, Oral, Q4H      IV Meds:        Results Reviewed:   I have personally reviewed the results from the time of this admission to 9/23/2024 15:07 EDT     Lab Results   Component Value Date    GLUCOSE 117 (H) 09/23/2024    CALCIUM 8.6 09/23/2024     (C) 09/23/2024    K 2.7 (L) 09/23/2024    CO2 27.3 09/23/2024    CL 82 (L) 09/23/2024    BUN 14 09/23/2024    CREATININE 1.61 (H) 09/23/2024    EGFRIFAFRI >60 08/31/2022    BCR 8.7 09/23/2024     ANIONGAP 9.7 09/23/2024      Lab Results   Component Value Date    MG 2.2 09/23/2024    PHOS 3.3 09/23/2024    ALBUMIN 3.6 09/23/2024           Assessment / Plan       Hyponatremia      ASSESSMENT:    -Acute hypovolemic hyponatremia likely secondary to chlorthalidone accompanied with increased GI losses and increased free water intake , initial urine sodium of 22 after receiving 1 L of NS.  Her blood pressure has dramatically improved waiting for repeat renal panel, urine osmolarity, and serum osmolarity.  Likely will continue gentle hydration,  -Acute kidney injury likely secondary to prerenal state from increased GI losses accompanied with chlorthalidone and lisinopril affecting renal autoregulation associated with chronic use of NSAIDs currently managed with IV fluids.   -Chronic hypokalemia.  Has been between 3.1 and 3.3 for the last 4 years the patient has been on chlorthalidone that could be a contributing factor.  But concerning for hyperaldosteronism state.  Patient could benefit from K sparing agent during admission .  Continue oral KCl replacement magnesium levels stable at 2.2,  -Diarrhea with increased GI losses on replacement  -Hypertension initially hypotensive in the ER holding antihypertensive medications will resume gradually  -Tobacco abuse counseling provided in [place]    PLAN:  -Awaiting repeat renal panel and a urine studies to determine the course of action to follow likely continue diuretics and continue KCl replacement  -Continue surveillance labs    Encounter held in [place]    Thank you for involving us in the care of Vadim Elder.  Please feel free to call with any questions.    Pro Jordan MD  09/23/24  15:07 EDT    [facility]  [phone]      Please note that portions of this note were completed with a voice recognition program.

## 2024-09-24 PROBLEM — R79.89 ELEVATED TROPONIN: Status: ACTIVE | Noted: 2024-09-24

## 2024-09-24 PROBLEM — E87.6 HYPOKALEMIA: Status: ACTIVE | Noted: 2024-09-24

## 2024-09-24 PROBLEM — E86.0 DEHYDRATION: Status: ACTIVE | Noted: 2024-09-24

## 2024-09-24 PROBLEM — N17.9 ACUTE KIDNEY INJURY: Status: ACTIVE | Noted: 2024-09-24

## 2024-09-24 PROBLEM — E66.9 OBESITY (BMI 30-39.9): Status: ACTIVE | Noted: 2024-09-24

## 2024-09-24 PROBLEM — N20.0 NEPHROLITHIASIS: Status: ACTIVE | Noted: 2024-09-24

## 2024-09-24 PROBLEM — I10 ESSENTIAL HYPERTENSION: Status: ACTIVE | Noted: 2024-09-24

## 2024-09-24 PROBLEM — R42 DIZZINESS: Status: ACTIVE | Noted: 2024-09-24

## 2024-09-24 LAB
ALBUMIN SERPL-MCNC: 3.6 G/DL (ref 3.5–5.2)
ANION GAP SERPL CALCULATED.3IONS-SCNC: 7.1 MMOL/L (ref 5–15)
ANION GAP SERPL CALCULATED.3IONS-SCNC: 9.6 MMOL/L (ref 5–15)
BUN SERPL-MCNC: 10 MG/DL (ref 6–20)
BUN SERPL-MCNC: 12 MG/DL (ref 6–20)
BUN/CREAT SERPL: 11 (ref 7–25)
BUN/CREAT SERPL: 12.4 (ref 7–25)
CALCIUM SPEC-SCNC: 8.6 MG/DL (ref 8.6–10.5)
CALCIUM SPEC-SCNC: 9.2 MG/DL (ref 8.6–10.5)
CHLORIDE SERPL-SCNC: 90 MMOL/L (ref 98–107)
CHLORIDE SERPL-SCNC: 95 MMOL/L (ref 98–107)
CO2 SERPL-SCNC: 24.4 MMOL/L (ref 22–29)
CO2 SERPL-SCNC: 24.9 MMOL/L (ref 22–29)
CREAT SERPL-MCNC: 0.91 MG/DL (ref 0.76–1.27)
CREAT SERPL-MCNC: 0.97 MG/DL (ref 0.76–1.27)
EGFRCR SERPLBLD CKD-EPI 2021: 91.6 ML/MIN/1.73
EGFRCR SERPLBLD CKD-EPI 2021: 98.9 ML/MIN/1.73
GLUCOSE SERPL-MCNC: 82 MG/DL (ref 65–99)
GLUCOSE SERPL-MCNC: 94 MG/DL (ref 65–99)
PHOSPHATE SERPL-MCNC: 3.1 MG/DL (ref 2.5–4.5)
POTASSIUM SERPL-SCNC: 3.4 MMOL/L (ref 3.5–5.2)
POTASSIUM SERPL-SCNC: 4 MMOL/L (ref 3.5–5.2)
POTASSIUM SERPL-SCNC: 4.1 MMOL/L (ref 3.5–5.2)
QT INTERVAL: 394 MS
QTC INTERVAL: 429 MS
SODIUM SERPL-SCNC: 124 MMOL/L (ref 136–145)
SODIUM SERPL-SCNC: 127 MMOL/L (ref 136–145)

## 2024-09-24 PROCEDURE — 25810000003 LACTATED RINGERS PER 1000 ML: Performed by: INTERNAL MEDICINE

## 2024-09-24 PROCEDURE — 93005 ELECTROCARDIOGRAM TRACING: CPT | Performed by: STUDENT IN AN ORGANIZED HEALTH CARE EDUCATION/TRAINING PROGRAM

## 2024-09-24 PROCEDURE — 25010000002 POTASSIUM CHLORIDE PER 2 MEQ OF POTASSIUM: Performed by: INTERNAL MEDICINE

## 2024-09-24 PROCEDURE — 84132 ASSAY OF SERUM POTASSIUM: CPT | Performed by: HOSPITALIST

## 2024-09-24 PROCEDURE — 80069 RENAL FUNCTION PANEL: CPT | Performed by: HOSPITALIST

## 2024-09-24 PROCEDURE — 80048 BASIC METABOLIC PNL TOTAL CA: CPT | Performed by: INTERNAL MEDICINE

## 2024-09-24 PROCEDURE — 93010 ELECTROCARDIOGRAM REPORT: CPT | Performed by: INTERNAL MEDICINE

## 2024-09-24 RX ORDER — POTASSIUM CHLORIDE 750 MG/1
40 TABLET, FILM COATED, EXTENDED RELEASE ORAL EVERY 4 HOURS
Status: DISCONTINUED | OUTPATIENT
Start: 2024-09-24 | End: 2024-09-24

## 2024-09-24 RX ORDER — VALSARTAN 160 MG/1
160 TABLET ORAL
Status: DISCONTINUED | OUTPATIENT
Start: 2024-09-24 | End: 2024-09-25

## 2024-09-24 RX ORDER — POTASSIUM CHLORIDE 750 MG/1
40 TABLET, FILM COATED, EXTENDED RELEASE ORAL
Status: COMPLETED | OUTPATIENT
Start: 2024-09-24 | End: 2024-09-24

## 2024-09-24 RX ADMIN — Medication 10 ML: at 21:54

## 2024-09-24 RX ADMIN — POTASSIUM CHLORIDE: 2 INJECTION, SOLUTION, CONCENTRATE INTRAVENOUS at 08:38

## 2024-09-24 RX ADMIN — CARVEDILOL 12.5 MG: 12.5 TABLET, FILM COATED ORAL at 08:14

## 2024-09-24 RX ADMIN — POTASSIUM CHLORIDE 40 MEQ: 750 TABLET, EXTENDED RELEASE ORAL at 10:27

## 2024-09-24 RX ADMIN — CARVEDILOL 12.5 MG: 12.5 TABLET, FILM COATED ORAL at 21:53

## 2024-09-24 RX ADMIN — ACETAMINOPHEN 325MG 650 MG: 325 TABLET ORAL at 08:25

## 2024-09-24 RX ADMIN — POTASSIUM CHLORIDE 40 MEQ: 750 TABLET, EXTENDED RELEASE ORAL at 03:49

## 2024-09-24 RX ADMIN — POTASSIUM CHLORIDE 40 MEQ: 750 TABLET, EXTENDED RELEASE ORAL at 08:14

## 2024-09-24 RX ADMIN — VALSARTAN 160 MG: 160 TABLET, FILM COATED ORAL at 16:18

## 2024-09-24 RX ADMIN — POTASSIUM CHLORIDE 40 MEQ: 750 TABLET, EXTENDED RELEASE ORAL at 13:35

## 2024-09-24 RX ADMIN — Medication 10 ML: at 08:14

## 2024-09-24 RX ADMIN — NICOTINE 1 PATCH: 14 PATCH TRANSDERMAL at 08:16

## 2024-09-24 NOTE — PROGRESS NOTES
[facility] Progress Note      Patient Name: Vadim Elder  : 1970  MRN: [id number]  Primary Care Physician:  Shiva Gutierrez MD  Date of admission: 2024    Subjective     Interval History:     Patient lying in bed feeling comfortable   Denies any chest pain or palpitations  Tolerating oral intake  No nausea or emesis    Urinary spontaneously    Review of Systems:   As noted above    Objective     Vitals:   Temp:  [97.3 °F (36.3 °C)-98.2 °F (36.8 °C)] 98.2 °F (36.8 °C)  Heart Rate:  [70-79] 79  Resp:  [16] 16  BP: (104-172)/(48-96) 161/82    Intake/Output Summary (Last 24 hours) at 2024 1528  Last data filed at 2024 1403  Gross per 24 hour   Intake 1040 ml   Output 2250 ml   Net -1210 ml       Physical Exam:    General Appearance: alert, oriented x 3, no acute distress   Skin: warm and dry  HEENT: oral mucosa normal, nonicteric sclera  Neck: supple, no JVD  Lungs: CTA  Heart: RRR, normal S1 and S2  Abdomen: soft, nontender, nondistended  : no palpable bladder  Extremities: no edema, cyanosis or clubbing  Neuro: normal speech and mental status     Scheduled Meds:     carvedilol, 12.5 mg, Oral, BID  nicotine, 1 patch, Transdermal, Q24H  sodium chloride, 10 mL, Intravenous, Q12H  valsartan, 160 mg, Oral, Q24H      IV Meds:   lactated ringers 980 mL with potassium chloride 40 mEq infusion, , Last Rate: 125 mL/hr at 24 0838        Results Reviewed:   I have personally reviewed the results from the time of this admission to 2024 15:28 EDT     Results from last 7 days   Lab Units 24  1252 24  1148 24  0148 24  1554 24  1111   SODIUM mmol/L 127*  --  124* 122* 119*   POTASSIUM mmol/L 4.1 4.0 3.4* 3.0* 2.7*   CHLORIDE mmol/L 95*  --  90* 85* 82*   CO2 mmol/L 24.9  --  24.4 26.0 27.3   BUN mg/dL 10  --  12 14 14   CREATININE mg/dL 0.91  --  0.97 1.21 1.61*   CALCIUM mg/dL 9.2  --  8.6 9.0 8.6   BILIRUBIN mg/dL  --   --   --   --   0.6   ALK PHOS U/L  --   --   --   --  53   ALT (SGPT) U/L  --   --   --   --  20   AST (SGOT) U/L  --   --   --   --  27   GLUCOSE mg/dL 82  --  94 105* 117*       Estimated Creatinine Clearance: 101.2 mL/min (by C-G formula based on SCr of 0.91 mg/dL).    Results from last 7 days   Lab Units 09/24/24  0148 09/23/24  1554 09/23/24  1111   MAGNESIUM mg/dL  --   --  2.2   PHOSPHORUS mg/dL 3.1 3.4 3.3       Results from last 7 days   Lab Units 09/23/24  1111   URIC ACID mg/dL 4.6       Results from last 7 days   Lab Units 09/23/24  1111   WBC 10*3/mm3 9.94   HEMOGLOBIN g/dL 15.2   PLATELETS 10*3/mm3 226             Assessment / Plan     ASSESSMENT:    -Acute hypovolemic hyponatremia likely secondary to chlorthalidone accompanied with increased GI losses and increased free water intake , initial urine sodium of 22 after receiving 1 L of NS.  Her blood pressure has dramatically improved waiting for repeat renal panel, urine osmolarity, and serum osmolarity.  Likely will continue gentle hydration,  -Acute kidney injury likely secondary to prerenal state from increased GI losses accompanied with chlorthalidone and lisinopril affecting renal autoregulation associated with chronic use of NSAIDs currently managed with IV fluids.   -Chronic hypokalemia.  Has been between 3.1 and 3.3 for the last 4 years the patient has been on chlorthalidone that could be a contributing factor.  But concerning for hyperaldosteronism state.  Patient could benefit from K sparing agent during admission .  Continue oral KCl replacement magnesium levels stable at 2.2,  -Diarrhea with increased GI losses on replacement  -Hypertension initially hypotensive in the ER holding antihypertensive medications will resume gradually  -Tobacco abuse counseling provided in [place] of [place]       PLAN:  Patient had responded to IV fluid challenge his sodium is gradually improving as well as his hemodynamics  Will restart patient on antihypertensive medications instead  of lisinopril will add an increased dose of valsartan.  Will suggest to discontinue chlorthalidone.   Upon discharge patient can be followed closely in renal clinic for further titration of his antihypertensive medications  Will continue surveillance labs    Discussed with Dr Carlton     Thank you for involving us in the care of Vadim Elder.  Please feel free to call with any questions.    Encounter held in [place] patient verbalized understanding    Pro Jordan MD  09/24/24  15:28 EDT    [facility]  [phone]    Please note that portions of this note were completed with a voice recognition program.

## 2024-09-24 NOTE — PAYOR COMM NOTE
"Vadim Elder (54 y.o. Male)      PLEASE SEE ATTACHED FOR INPT AUTH.     REF#[id number]    PLEASE CALL  OR  [phone]    THANK YOU    MIRI MARION LPN CCP   Date of Birth   1970    Social Security Number       Address   [address] [place] [address]    Home Phone   [phone]    MRN   [id number]       Orthodoxy   None    Marital Status                               Admission Date   24    Admission Type   Emergency    Admitting Provider   Ralph Dela Cruz MD    Attending Provider   Jung Carlton DO    Department, Room/Bed   [address], [address]/1       Discharge Date       Discharge Disposition       Discharge Destination                                 Attending Provider: Jung Carlton DO    Allergies: No Known Allergies    Isolation: None   Infection: None   Code Status: CPR    Ht: 172.7 cm (68\")   Wt: 94.7 kg (208 lb 12.4 oz)    Admission Cmt: None   Principal Problem: Hyponatremia [E87.1]                   Active Insurance as of 2024       Primary Coverage       Payor Plan Insurance Group Employer/Plan Group    [employer] BLUE CROSS [facility] [employer] BLUE SHIELD PPO [id number]       Payor Plan Address Payor Plan Phone Number Payor Plan Fax Number Effective Dates    [address] [phone]  2020 - None Entered    [place] [address]         Subscriber Name Subscriber Birth Date Member ID       VADIM ELDER 1970 [id number]                     Emergency Contacts        (Rel.) Home Phone Work Phone Mobile Phone    CYDNEYECHO (Spouse) -- -- [phone]              [place]: [facility] [id number]  Tax ID [id number]     History & Physical        Ralph Dela Cruz MD at 24 [phone]          HISTORY AND PHYSICAL   [facility]        Patient Identification:  Name: Vadim Elder  Age: 54 y.o.  Sex: male  :  1970  MRN: [id number]                     Primary Care Physician: Shiva Gutierrez, " MD    Chief Complaint: Lightheaded    History of Present Illness:   Pleasant 54-year-old gentleman with history of hypertension for which she has been taking Hygroton apparently for a number of years now.  He works in air conditioning.  He was feeling lightheaded at work today and EMS was called.  He was hypotensive on presentation.  He does note that he has been having loose stools and he had increased his oral intake.  Workup revealed significant hyponatremia as well as hypokalemia.  Available records indicate that he has had chronic hypokalemia though not to this severity.  In addition there have been episodes of hyponatremia in the past.  Presently feeling better after IV fluids.        Past Medical History:  History reviewed. No pertinent past medical history.  Past Surgical History:  History reviewed. No pertinent surgical history.   Home Meds:  Medications Prior to Admission   Medication Sig Dispense Refill Last Dose    carvedilol (COREG) 12.5 MG tablet Take 1 tablet by mouth 2 (Two) Times a Day.       chlorthalidone (HYGROTON) 25 MG tablet Take 1 tablet by mouth Daily.       cloNIDine (CATAPRES) 0.1 MG tablet Take 1 tablet by mouth 2 (Two) Times a Day.       lisinopril (PRINIVIL,ZESTRIL) 20 MG tablet Take 1 tablet by mouth Daily.       potassium chloride ER (K-TAB) 20 MEQ tablet controlled-release ER tablet Take 1 tablet by mouth 2 (Two) Times a Day.          Allergies:  No Known Allergies  Immunizations:    There is no immunization history on file for this patient.  Social History:   Social History     Social History Narrative    Not on file     Social History     Tobacco Use    Smoking status: Every Day     Current packs/day: 0.50     Average packs/day: 0.5 packs/day for 52.7 years (26.4 ttl pk-yrs)     Types: Cigarettes     Start date: 1972    Smokeless tobacco: Never   Substance Use Topics    Alcohol use: Yes     Alcohol/week: 12.0 standard drinks of alcohol     Types: 12 Cans of beer per week      Comment: 2 beer/day     Family History:  History reviewed. No pertinent family history.     Review of Systems  Review of Systems   Constitutional:         As per history of present illness       Objective:  T Max 24 hrs: Temp (24hrs), Av.9 °F (36.6 °C), Min:97.3 °F (36.3 °C), Max:98.5 °F (36.9 °C)    Vitals Ranges:   Temp:  [97.3 °F (36.3 °C)-98.5 °F (36.9 °C)] 97.3 °F (36.3 °C)  Heart Rate:  [66-78] 74  Resp:  [16] 16  BP: (105-167)/(55-94) 167/85      Exam:  Physical Exam  Constitutional:       General: He is not in acute distress.     Appearance: Normal appearance. He is normal weight. He is not ill-appearing, toxic-appearing or diaphoretic.   HENT:      Head: Normocephalic and atraumatic.      Right Ear: External ear normal.      Left Ear: External ear normal.      Nose: Nose normal.      Mouth/Throat:      Mouth: Mucous membranes are moist.   Eyes:      General: No scleral icterus.        Right eye: No discharge.         Left eye: No discharge.      Extraocular Movements: Extraocular movements intact.      Conjunctiva/sclera: Conjunctivae normal.   Cardiovascular:      Rate and Rhythm: Normal rate and regular rhythm.      Heart sounds:      No friction rub. No gallop.   Pulmonary:      Effort: Pulmonary effort is normal.      Breath sounds: Normal breath sounds.   Abdominal:      General: Abdomen is flat. Bowel sounds are normal. There is no distension.      Palpations: Abdomen is soft. There is no mass.      Tenderness: There is no abdominal tenderness. There is no guarding or rebound.   Musculoskeletal:      Cervical back: Neck supple.      Right lower leg: No edema.      Left lower leg: No edema.   Skin:     General: Skin is warm and dry.   Neurological:      General: No focal deficit present.      Mental Status: He is alert and oriented to person, place, and time.   Psychiatric:         Mood and Affect: Mood normal.         Behavior: Behavior normal.         Thought Content: Thought content normal.          Judgment: Judgment normal.         Data Review:  All labs and radiology reviewed.    Assessment:  Hyponatremia: Associated with the use of thiazide diuretics as well as increased free fluid intake.  Nephrology input appreciated.  Continue hydration and hold Hygroton.  Recommend looking to change this to a different antihypertensive.  Hypokalemia: Continue replacement protocol.  Keep Hygroton on hold.  JEFFREY/dehydration: Already showing good response to IV hydration.  Hold Hygroton.  Continue hydration.  Hypertension: Resume home meds with exception of Hygroton.  Lightheadedness: Secondary to hypotension secondary to antihypertensives and dehydration.  Resolved.  Recent history of loose stools: Monitor.  If persist consider GI panel.      Plan:  Please see above.  Discussed with ER provider.  Considering the size of his bladder on CT scan will also check postvoid residual.    Ralph Dela Cruz MD  9/23/2024  17:57 EDT    EMR Dragon/Transcription disclaimer:   Much of this encounter note is an electronic transcription/translation of spoken language to printed text. The electronic translation of spoken language may permit erroneous, or at times, nonsensical words or phrases to be inadvertently transcribed; Although I have reviewed the note for such errors, some may still exist.       Electronically signed by Ralph Dela Cruz MD at 09/23/24 1806          Emergency Department Notes        Joycelyn West RN at 09/23/24 1443          ..Nursing report [facility]  54 y.o.  male    HPI :  HPI (Adult)  Stated Reason for Visit: syncopal episode at work, denies hitting head, no thinners, low BP upon ems arrival 80s systolic  History Obtained From: EMS    Chief Complaint  Chief Complaint   Patient presents with    Syncope       Admitting doctor:   Ralph Dela Cruz MD    Admitting diagnosis:   The primary encounter diagnosis was Near syncope. Diagnoses of Hyponatremia and Hypokalemia were also  pertinent to this visit.    Code status:   Current Code Status       Date Active Code Status Order ID Comments User Context       Not on file            Allergies:   Patient has no known allergies.    Isolation:   No active isolations    Intake and Output    Intake/Output Summary (Last 24 hours) at 9/23/2024 1443  Last data filed at 9/23/2024 1443  Gross per 24 hour   Intake 1700 ml   Output 400 ml   Net 1300 ml       Weight:       09/23/24  1259   Weight: 92.5 kg (204 lb)       Most recent vitals:   Vitals:    09/23/24 1308 09/23/24 1309 09/23/24 1331 09/23/24 1431   BP: 143/86  155/86 155/94   BP Location:       Pulse: 73 73 66 78   Resp:       Temp:       TempSrc:       SpO2: 99% 98% 97% 94%   Weight:       Height:           Active LDAs/IV Access:   Lines, Drains & Airways       Active LDAs       Name Placement date Placement time Site Days    Peripheral IV 09/23/24 1044 Right Antecubital 09/23/24  1044  Antecubital  less than 1                    Labs (abnormal labs have a star):   Labs Reviewed   COMPREHENSIVE METABOLIC PANEL - Abnormal; Notable for the following components:       Result Value    Glucose 117 (*)     Creatinine 1.61 (*)     Sodium 119 (*)     Potassium 2.7 (*)     Chloride 82 (*)     Total Protein 5.7 (*)     eGFR 49.9 (*)     All other components within normal limits    Narrative:     GFR Normal >60  Chronic Kidney Disease <60  Kidney Failure <15     SINGLE HS TROPONIN T - Abnormal; Notable for the following components:    HS Troponin T 23 (*)     All other components within normal limits    Narrative:     High Sensitive Troponin T Reference Range:  <14.0 ng/L- Negative Female for AMI  <22.0 ng/L- Negative Male for AMI  >=14 - Abnormal Female indicating possible myocardial injury.  >=22 - Abnormal Male indicating possible myocardial injury.   Clinicians would have to utilize clinical acumen, EKG, Troponin, and serial changes to determine if it is an Acute Myocardial Infarction or myocardial  injury due to an underlying chronic condition.        CBC WITH AUTO DIFFERENTIAL - Abnormal; Notable for the following components:    Neutrophil % 76.3 (*)     Lymphocyte % 12.2 (*)     Neutrophils, Absolute 7.59 (*)     Monocytes, Absolute 0.95 (*)     All other components within normal limits   GASTROINTESTINAL PANEL, PCR (PREFERRED) DOES NOT INCLUDE CDIFF - Normal    Narrative:     If Aeromonas, Staphylococcus aureus or Bacillus cereus are suspected, please order [id number]: Stool Culture, Aeromonas, S aureus, B Cereus.   MAGNESIUM - Normal   TSH RFX ON ABNORMAL TO FREE T4 - Normal   URIC ACID - Normal   PHOSPHORUS - Normal   CORTISOL    Narrative:     Cortisol Reference Ranges:    Cortisol 6AM - 10AM Range: 6.02-18.40 mcg/dl  Cortisol 4PM - 8PM Range: 2.68-10.50 mcg/dl      Results may be falsely increased if patient taking Biotin.     RENAL FUNCTION PANEL   OSMOLALITY, SERUM   SODIUM, URINE, RANDOM   POTASSIUM, URINE, RANDOM   CHLORIDE, URINE, RANDOM   OSMOLALITY, URINE   CBC AND DIFFERENTIAL    Narrative:     The following orders were created for panel order CBC & Differential.  Procedure                               Abnormality         Status                     ---------                               -----------         ------                     CBC Auto Differential[[id number]]        Abnormal            Final result                 Please view results for these tests on the individual orders.       EKG:   ECG 12 Lead Syncope   Preliminary Result   HEART RATE=75  bpm   RR Xtwaaecq=663  ms   MD Qbaftrcq=313  ms   P Horizontal Axis=-19  deg   P Front Axis=45  deg   QRSD Wslfparv=753  ms   QT Rpzghmqv=503  ms   PRvF=415  ms   QRS Axis=55  deg   T Wave Axis=167  deg   - ABNORMAL ECG -   Sinus rhythm   Prolonged MD interval   Probable left atrial enlargement   Repol abnrm suggests ischemia, lateral leads   Date and Time of Study:2024-09-23 12:04:03      Telemetry Scan   Final Result      Telemetry Scan   Final  Result          Meds given in ED:   Medications   Potassium Replacement - Follow Nurse / BPA Driven Protocol (has no administration in time range)   potassium chloride (K-DUR,KLOR-CON) ER tablet 40 mEq (40 mEq Oral Given 9/23/24 1304)   sodium chloride 0.9 % bolus 1,000 mL (0 mL Intravenous Stopped 9/23/24 1329)   iopamidol (ISOVUE-300) 61 % injection 100 mL (85 mL Intravenous Given by Other 9/23/24 1242)       Imaging results:  CT Abdomen Pelvis With Contrast    Result Date: 9/23/2024   1. No acute findings identified in the abdomen or pelvis. 2. Moderate bladder distention. Clinical correlation. 3. Nonobstructing nephrolithiasis.  This report was finalized on 9/23/2024 1:19 PM by Dr. Juan Vasquez M.D on Workstation: [id number]      CT Head Without Contrast    Result Date: 9/23/2024  No acute process is identified. Further evaluation could be performed with a MRI examination of the brain as indicated.      Radiation dose reduction techniques were utilized, including automated exposure modulation based on body size.  This report was finalized on 9/23/2024 1:00 PM by Dr. Fady Merchant M.D on Workstation: [id number]       Ambulatory status:   - up with assist x1    Social issues:   Social History     Socioeconomic History    Marital status:        Peripheral Neurovascular  Peripheral Neurovascular (Adult)  Peripheral Neurovascular WDL: WDL    Neuro Cognitive  Neuro Cognitive (Adult)  Cognitive/Neuro/Behavioral WDL: WDL  Orientation: oriented x 4  Speech: logical, clear  Pupils  Pupil PERRLA: yes    Learning  Learning Assessment (Adult)  Learning Readiness and Ability: language barrier identified  :  requested    Respiratory  Respiratory WDL  Respiratory WDL: WDL    Abdominal Pain       Pain Assessments  Pain (Adult)  (0-10) Pain Rating: Rest: 0  (0-10) Pain Rating: Activity: 0    NIH Stroke Scale       Joycelyn West RN  09/23/24 14:43 EDT      Electronically signed by Jenna  Joycelyn RN at 09/23/24 1444       Andrew Magdaleno MD at 09/23/24 1110           EMERGENCY DEPARTMENT ENCOUNTER    Room Number:  25/25  PCP: Provider, No Known  Historian: Patient      HPI:  Chief Complaint: Syncope  A complete HPI/ROS/PMH/PSH/SH/FH are unobtainable due to: Language barrier history obtained through   Context: Vadim Elder is a 54 y.o. male who presents to the ED c/o syncope.  Patient states he is never passed out before.  Patient was at work today and his vision got blurry.  Patient apparently went down.  Did not hit his head.  Patient's blood pressure initially was low better now.  Had no chest pain or shortness of breath.  No heart racing.  No abdominal pain.  No vomiting or diarrhea.  No fevers or chills.            PAST MEDICAL HISTORY  Active Ambulatory Problems     Diagnosis Date Noted    No Active Ambulatory Problems     Resolved Ambulatory Problems     Diagnosis Date Noted    No Resolved Ambulatory Problems     No Additional Past Medical History         PAST SURGICAL HISTORY  History reviewed. No pertinent surgical history.      FAMILY HISTORY  History reviewed. No pertinent family history.      SOCIAL HISTORY  Social History     Socioeconomic History    Marital status:    Substance and Sexual Activity    Alcohol use: Yes     Alcohol/week: 12.0 standard drinks of alcohol     Types: 12 Cans of beer per week     Comment: 2 beer/day         ALLERGIES  Patient has no known allergies.        REVIEW OF SYSTEMS  Review of Systems   Syncope      PHYSICAL EXAM  ED Triage Vitals   Temp Heart Rate Resp BP SpO2   09/23/24 1044 09/23/24 1044 09/23/24 1044 09/23/24 1044 09/23/24 1044   98.5 °F (36.9 °C) 71 16 105/55 95 %      Temp src Heart Rate Source Patient Position BP Location FiO2 (%)   09/23/24 1044 09/23/24 1044 -- 09/23/24 1105 --   Oral Monitor  Right arm        Physical Exam      GENERAL: no acute distress  HENT: nares patent  EYES: no scleral icterus  CV: regular rhythm,  normal rate  RESPIRATORY: normal effort  ABDOMEN: soft  MUSCULOSKELETAL: no deformity  NEURO: alert, moves all extremities, follows commands  PSYCH:  calm, cooperative  SKIN: warm, dry    Vital signs and nursing notes reviewed.          LAB RESULTS  Recent Results (from the past 24 hour(s))   Comprehensive Metabolic Panel    Collection Time: 09/23/24 11:11 AM    Specimen: Blood   Result Value Ref Range    Glucose 117 (H) 65 - 99 mg/dL    BUN 14 6 - 20 mg/dL    Creatinine 1.61 (H) 0.76 - 1.27 mg/dL    Sodium 119 (C) 136 - 145 mmol/L    Potassium 2.7 (L) 3.5 - 5.2 mmol/L    Chloride 82 (L) 98 - 107 mmol/L    CO2 27.3 22.0 - 29.0 mmol/L    Calcium 8.6 8.6 - 10.5 mg/dL    Total Protein 5.7 (L) 6.0 - 8.5 g/dL    Albumin 3.6 3.5 - 5.2 g/dL    ALT (SGPT) 20 1 - 41 U/L    AST (SGOT) 27 1 - 40 U/L    Alkaline Phosphatase 53 39 - 117 U/L    Total Bilirubin 0.6 0.0 - 1.2 mg/dL    Globulin 2.1 gm/dL    A/G Ratio 1.7 g/dL    BUN/Creatinine Ratio 8.7 7.0 - 25.0    Anion Gap 9.7 5.0 - 15.0 mmol/L    eGFR 49.9 (L) >60.0 mL/min/1.73   Single High Sensitivity Troponin T    Collection Time: 09/23/24 11:11 AM    Specimen: Blood   Result Value Ref Range    HS Troponin T 23 (H) <22 ng/L   CBC Auto Differential    Collection Time: 09/23/24 11:11 AM    Specimen: Blood   Result Value Ref Range    WBC 9.94 3.40 - 10.80 10*3/mm3    RBC 4.74 4.14 - 5.80 10*6/mm3    Hemoglobin 15.2 13.0 - 17.7 g/dL    Hematocrit 43.8 37.5 - 51.0 %    MCV 92.4 79.0 - 97.0 fL    MCH 32.1 26.6 - 33.0 pg    MCHC 34.7 31.5 - 35.7 g/dL    RDW 12.6 12.3 - 15.4 %    RDW-SD 43.0 37.0 - 54.0 fl    MPV 9.2 6.0 - 12.0 fL    Platelets 226 140 - 450 10*3/mm3    Neutrophil % 76.3 (H) 42.7 - 76.0 %    Lymphocyte % 12.2 (L) 19.6 - 45.3 %    Monocyte % 9.6 5.0 - 12.0 %    Eosinophil % 0.9 0.3 - 6.2 %    Basophil % 0.6 0.0 - 1.5 %    Immature Grans % 0.4 0.0 - 0.5 %    Neutrophils, Absolute 7.59 (H) 1.70 - 7.00 10*3/mm3    Lymphocytes, Absolute 1.21 0.70 - 3.10 10*3/mm3     Monocytes, Absolute 0.95 (H) 0.10 - 0.90 10*3/mm3    Eosinophils, Absolute 0.09 0.00 - 0.40 10*3/mm3    Basophils, Absolute 0.06 0.00 - 0.20 10*3/mm3    Immature Grans, Absolute 0.04 0.00 - 0.05 10*3/mm3    nRBC 0.0 0.0 - 0.2 /100 WBC   Magnesium    Collection Time: 09/23/24 11:11 AM    Specimen: Blood   Result Value Ref Range    Magnesium 2.2 1.6 - 2.6 mg/dL   TSH Rfx On Abnormal To Free T4    Collection Time: 09/23/24 11:11 AM    Specimen: Blood   Result Value Ref Range    TSH 2.370 0.270 - 4.200 uIU/mL   Uric Acid    Collection Time: 09/23/24 11:11 AM    Specimen: Blood   Result Value Ref Range    Uric Acid 4.6 3.4 - 7.0 mg/dL   Cortisol    Collection Time: 09/23/24 11:11 AM    Specimen: Blood   Result Value Ref Range    Cortisol 17.30   mcg/dL   Phosphorus    Collection Time: 09/23/24 11:11 AM    Specimen: Blood   Result Value Ref Range    Phosphorus 3.3 2.5 - 4.5 mg/dL   Gastrointestinal Panel, PCR - Stool, Per Rectum    Collection Time: 09/23/24 11:53 AM    Specimen: Per Rectum; Stool   Result Value Ref Range    Campylobacter Not Detected Not Detected    Plesiomonas shigelloides Not Detected Not Detected    Salmonella Not Detected Not Detected    Vibrio Not Detected Not Detected    Vibrio cholerae Not Detected Not Detected    Yersinia enterocolitica Not Detected Not Detected    Enteroaggregative E. coli (EAEC) Not Detected Not Detected    Enteropathogenic E. coli (EPEC) Not Detected Not Detected    Enterotoxigenic E. coli (ETEC) lt/st Not Detected Not Detected    Shiga-like toxin-producing E. coli (STEC) stx1/stx2 Not Detected Not Detected    Shigella/Enteroinvasive E. coli (EIEC) Not Detected Not Detected    Cryptosporidium Not Detected Not Detected    Cyclospora cayetanensis Not Detected Not Detected    Entamoeba histolytica Not Detected Not Detected    Giardia lamblia Not Detected Not Detected    Adenovirus F40/41 Not Detected Not Detected    Astrovirus Not Detected Not Detected    Norovirus GI/GII Not  Detected Not Detected    Rotavirus A Not Detected Not Detected    Sapovirus (I, II, IV or V) Not Detected Not Detected   ECG 12 Lead Syncope    Collection Time: 09/23/24 12:04 PM   Result Value Ref Range    QT Interval 434 ms    QTC Interval 485 ms       Ordered the above labs and reviewed the results.        RADIOLOGY  CT Abdomen Pelvis With Contrast    Result Date: 9/23/2024  CT ABDOMEN PELVIS W CONTRAST-  INDICATION: Abdominal pain, diarrhea  COMPARISON: None  TECHNIQUE: Routine CT abdomen and pelvis with IV contrast. Coronal and sagittal reformats. Radiation dose reduction techniques were utilized, including automated exposure control and exposure modulation based on body size.  FINDINGS:  Lung bases: Small amount of subsegmental atelectasis seen at the bases. Coronary artery atherosclerotic calcifications.  ABDOMEN: Small cyst seen in segment 2/3 of the left hepatic lobe. Normal gallbladder. No biliary ductal dilatation. Spleen is normal in size. No pancreatic mass or pancreatic ductal dilatation seen. No adrenal nodules. Fluid attenuating cyst seen in the inferior pole left kidney. No solid-appearing renal mass or hydronephrosis. Symmetric perinephric edema. Nonobstructing nephrolithiasis in the inferior pole right kidney, series 3, axial mage 71, measures 1 mm. Nonobstructing nephrolithiasis in the inferior pole left kidney, series 3, axial image 76, measures 3 mm.  Pelvis: Prostate is normal in size. Moderate bladder distention. No bladder calculus.  Bowel: No obstruction. Gas and fluid seen in the ascending colon. Normal appendix.  Abdominal wall: Rectus diastases.  Retroperitoneum: No lymphadenopathy.  Vasculature: No abdominal aortic aneurysm. Moderate aortoiliac atherosclerotic calcification. Atherosclerotic calcifications seen in the proximal SMA.  Osseous structures: Thoracic and lumbar spondylosis/degenerative disc disease with multilevel disc osteophyte complexes seen in the thoracic and lumbar  spine. Lower lumbar facet degenerative arthropathy.       1. No acute findings identified in the abdomen or pelvis. 2. Moderate bladder distention. Clinical correlation. 3. Nonobstructing nephrolithiasis.  This report was finalized on 9/23/2024 1:19 PM by Dr. Juan Vasquez M.D on Workstation: [id number]      CT Head Without Contrast    Result Date: 9/23/2024  CT HEAD WO CONTRAST-  HISTORY:  syncope  COMPARISON: None  FINDINGS: The brain ventricles are symmetrical. There is no evidence of hemorrhage, hydrocephalus or of abnormal extra-axial fluid. No focal area of decreased attenuation to suggest acute infarction is identified.      No acute process is identified. Further evaluation could be performed with a MRI examination of the brain as indicated.      Radiation dose reduction techniques were utilized, including automated exposure modulation based on body size.  This report was finalized on 9/23/2024 1:00 PM by Dr. Fady Merchant M.D on Workstation: [id number]       Ordered the above noted radiological studies.  CT abdomen independent interpreted by me and shows no evidence of obstruction          PROCEDURES  Procedures    EKG          EKG time: 1204  Rhythm/Rate: Normal sinus rhythm 75  P waves and FL: Normal P waves first-degree AV block  QRS, axis: Normal QRS  ST and T waves: Nonspecific ST-T wave    Interpreted Contemporaneously by me, independently viewed  No prior      MEDICATIONS GIVEN IN ER  Medications   Potassium Replacement - Follow Nurse / BPA Driven Protocol (has no administration in time range)   potassium chloride (K-DUR,KLOR-CON) ER tablet 40 mEq (40 mEq Oral Given 9/23/24 1304)   nicotine (NICODERM CQ) 14 MG/24HR patch 1 patch (has no administration in time range)   sodium chloride 0.9 % bolus 1,000 mL (0 mL Intravenous Stopped 9/23/24 1329)   iopamidol (ISOVUE-300) 61 % injection 100 mL (85 mL Intravenous Given by Other 9/23/24 1242)                   MEDICAL DECISION MAKING, PROGRESS, and  CONSULTS    All labs have been independently reviewed by me.  All radiology studies have been reviewed by me and I have also reviewed the radiology report.   EKG's independently viewed and interpreted by me.  Discussion below represents my analysis of pertinent findings related to patient's condition, differential diagnosis, treatment plan and final disposition.      Additional sources:  - Discussed/ obtained information from independent historians: None    - External (non-ED) record review: Epic reviewed and patient seen primary provider's office 9/10/2024 for hypertension    - Chronic or social conditions impacting care: None    - Shared decision making: None      Orders placed during this visit:  Orders Placed This Encounter   Procedures    Gastrointestinal Panel, PCR - Stool, Per Rectum    CT Head Without Contrast    CT Abdomen Pelvis With Contrast    Comprehensive Metabolic Panel    Single High Sensitivity Troponin T    CBC Auto Differential    Magnesium    Potassium    Renal Function Panel    Osmolality, Serum    Sodium, Urine, Random - Urine, Clean Catch    Potassium, Urine, Random - Urine, Clean Catch    Chloride, Urine, Random - Urine, Clean Catch    Osmolality, Urine - Urine, Clean Catch    TSH Rfx On Abnormal To Free T4    Uric Acid    Cortisol    Phosphorus    Nephrology (on -call MD unless specified)    [facility] (on-call MD unless specified) Details    Inpatient Nephrology Consult    ECG 12 Lead Syncope    Telemetry Scan    Telemetry Scan    Inpatient Admission    CBC & Differential         Additional orders considered but not ordered:  None        Differential diagnosis includes but is not limited to:    Hyponatremia hypokalemia dehydration      Independent interpretation of labs, radiology studies, and discussions with consultants:  ED Course as of 09/23/24 1450   Mon Sep 23, 2024   1414 14:14 EDT  Patient with near syncopal episode at work.  Has had multiple episodes of diarrhea here.  CT scan shows no  evidence of colitis.  Patient is on diuretic.  Patient was found to be hyponatremic.  Was given 1 L of normal saline.  Patient has been discussed with nephrology we will see patient in the hospital.  They have ordered several tests.  Patient discussed with Dr. Dela Cruz who will admit. [SL]   1449 14:49 EDT  Patient wanting to go home.  I again convinced him to stay in the hospital.  Patient has been seen by nephrology.  Will be admitted.  Will give him nicotine patch. [SL]      ED Course User Index  [SL] Andrew Magdaleno MD                 DIAGNOSIS  Final diagnoses:   Near syncope   Hyponatremia   Hypokalemia         DISPOSITION  admit            Latest Documented Vital Signs:  As of 14:50 EDT  BP- 155/94 HR- 78 Temp- 98.5 °F (36.9 °C) (Oral) O2 sat- 94%              --    Please note that portions of this were completed with a voice recognition program.       Note Disclaimer: At [facility], we believe that sharing information builds trust and better relationships. You are receiving this note because you are receiving care at [facility] or recently visited. It is possible you will see health information before a provider has talked with you about it. This kind of information can be easy to misunderstand. To help you fully understand what it means for your health, we urge you to discuss this note with your provider.            Andrew Magdaleno MD  09/23/24 1451      Electronically signed by Andrew Magdaleno MD at 09/23/24 1451       Dimitri Tate, RN at 09/23/24 1042          Syncopal episode at work    Electronically signed by Dimitri Tate RN at 09/23/24 1043       Oxygen Therapy (since admission)       Date/Time SpO2 Device (Oxygen Therapy) Flow (L/min) Oxygen Concentration (%) ETCO2 (mmHg)    09/24/24 1641 95 room air -- -- --    09/24/24 1637 98 room air -- -- --    09/24/24 1635 100 room air -- -- --    09/24/24 1338 99 -- -- -- --    09/24/24 1337 95 room air -- -- --    09/24/24  0735 100 room air -- -- --    09/24/24 0045 -- room air -- -- --    09/23/24 2315 96 -- -- -- --    09/23/24 2115 -- room air -- -- --    09/23/24 1946 98 -- -- -- --    09/23/24 1700 -- room air -- -- --    09/23/24 1645 98 room air -- -- --    09/23/24 1431 94 -- -- -- --    09/23/24 1331 97 -- -- -- --    09/23/24 1309 98 -- -- -- --    09/23/24 1308 99 -- -- -- --    09/23/24 1229 95 -- -- -- --    09/23/24 1211 97 -- -- -- --    09/23/24 1105 -- room air -- -- --    09/23/24 1044 95 -- -- -- --          Intake & Output (last 2 days)         09/23 0701  09/24 0700 09/24 0701 09/25 0700    P.O. 560 840    I.V. (mL/kg) 700 (7.4)     IV Piggyback 1000     Total Intake(mL/kg) 2260 (23.9) 840 (8.9)    Urine (mL/kg/hr) 1300 1700 (1.5)    Total Output 1300 1700    Net +960 -860                Lines, Drains & Airways       Active LDAs       Name Placement date Placement time Site Days    Peripheral IV 09/23/24 1044 Right Antecubital 09/23/24  1044  Antecubital  1              Inactive LDAs       None                  Medication Administration Report for Vadim Elder as of 9/23/24 through 9/24/24     Legend:    Given Hold Not Given Due Canceled Entry Other Actions    Time Time (Time) Time Time-Action         Discontinued     Completed     Future     MAR Hold     Linked             Medications 09/23/24 09/24/24      acetaminophen (TYLENOL) tablet 650 mg  Dose: 650 mg  Freq: Every 4 Hours PRN Route: PO  PRN Reason: Mild Pain  Start: 09/23/24 1808     Admin Instructions:   If given for fever, use fever parameter: fever greater than 100.4 °F  Based on patient request - if ordered for moderate or severe pain, provider allows for administration of a medication prescribed for a lower pain scale.    Do not exceed 4 grams of acetaminophen in a 24 hr period. Max dose of 2gm for AST/ALT greater than 120 units/L.    If given for pain, use the following pain scale:   Mild Pain = Pain Score of 1-3, CPOT 1-2  Moderate Pain = Pain  "Score of 4-6, CPOT 3-4  Severe Pain = Pain Score of 7-10, CPOT 5-8      1823-Given            0825-Given              Or   acetaminophen (TYLENOL) 160 MG/5ML oral solution 650 mg  Dose: 650 mg  Freq: Every 4 Hours PRN Route: PO  PRN Reason: Mild Pain  Start: 09/23/24 1808     Admin Instructions:   If given for fever, use fever parameter: fever greater than 100.4 °F  Based on patient request - if ordered for moderate or severe pain, provider allows for administration of a medication prescribed for a lower pain scale.    Do not exceed 4 grams of acetaminophen in a 24 hr period. Max dose of 2gm for AST/ALT greater than 120 units/L.    If given for pain, use the following pain scale:   Mild Pain = Pain Score of 1-3, CPOT 1-2  Moderate Pain = Pain Score of 4-6, CPOT 3-4  Severe Pain = Pain Score of 7-10, CPOT 5-8      1823-Not Given:  See Alt            0825-Not Given:  See Alt              Or   acetaminophen (TYLENOL) suppository 650 mg  Dose: 650 mg  Freq: Every 4 Hours PRN Route: RE  PRN Reason: Mild Pain  Start: 09/23/24 1808     Admin Instructions:   If given for fever, use fever parameter: fever greater than 100.4 °F  Based on patient request - if ordered for moderate or severe pain, provider allows for administration of a medication prescribed for a lower pain scale.    Do not exceed 4 grams of acetaminophen in a 24 hr period. Max dose of 2gm for AST/ALT greater than 120 units/L.    If given for pain, use the following pain scale:   Mild Pain = Pain Score of 1-3, CPOT 1-2  Moderate Pain = Pain Score of 4-6, CPOT 3-4  Severe Pain = Pain Score of 7-10, CPOT 5-8      1823-Not Given:  See Alt            0825-Not Given:  See Alt              Calcium Replacement - Follow Nurse / BPA Driven Protocol  Freq: As Needed Route: XX  PRN Reason: Other  Start: 09/24/24 1353     Admin Instructions:   Open Order & Select \"[facility] Electrolyte Replacement Protocol Algorithm\" to View Details          carvedilol (COREG) tablet 12.5 " "mg  Dose: 12.5 mg  Freq: 2 Times Daily Route: PO  Start: 09/23/24 2100     Admin Instructions:   Hold for SBP less than 100, DBP less than 60, or heart rate less than 50. If a dose is held, please contact the provider.  Give with food.      2116-Given            0814-Given     2100             Magnesium Standard Dose Replacement - Follow Nurse / BPA Driven Protocol  Freq: As Needed Route: XX  PRN Reason: Other  Start: 09/24/24 1353     Admin Instructions:   Open Order & Select \"[facility] Electrolyte Replacement Protocol Algorithm\" to View Details          nicotine (NICODERM CQ) 14 MG/24HR patch 1 patch  Dose: 1 patch  Freq: Every 24 Hours Scheduled Route: TD  Start: 09/23/24 1503     Admin Instructions:   Apply to clean, dry, nonhairy area of skin (typically upper arm or shoulder)  Dispose of nicotine replacement therapies and their wrappers in non-hazardous pharmaceutical waste or in regular trash.      2115-Medication Applied            0815-Medication Removed     0816-Medication Applied             nitroglycerin (NITROSTAT) SL tablet 0.4 mg  Dose: 0.4 mg  Freq: Every 5 Minutes PRN Route: SL  PRN Reason: Chest Pain  PRN Comment: Only if SBP Greater Than 100  Start: 09/23/24 1807     Admin Instructions:   If Pain Unrelieved After 3 Doses Notify MD  May administer up to 3 doses per episode.           ondansetron ODT (ZOFRAN-ODT) disintegrating tablet 4 mg  Dose: 4 mg  Freq: Every 6 Hours PRN Route: PO  PRN Reasons: Nausea,Vomiting  Start: 09/23/24 1808     Admin Instructions:   If BOTH ondansetron (ZOFRAN) and promethazine (PHENERGAN) are ordered use ondansetron first and THEN promethazine IF ondansetron is ineffective.  Place on tongue and allow to dissolve.          Or   ondansetron (ZOFRAN) injection 4 mg  Dose: 4 mg  Freq: Every 6 Hours PRN Route: IV  PRN Reasons: Nausea,Vomiting  Start: 09/23/24 1808     Admin Instructions:   If BOTH ondansetron (ZOFRAN) and promethazine (PHENERGAN) are ordered use ondansetron first " "and THEN promethazine IF ondansetron is ineffective.          Phosphorus Replacement - Follow Nurse / BPA Driven Protocol  Freq: As Needed Route: XX  PRN Reason: Other  Start: 09/24/24 1353     Admin Instructions:   Open Order & Select \"[facility] Electrolyte Replacement Protocol Algorithm\" to View Details          Potassium Replacement - Follow Nurse / BPA Driven Protocol  Freq: As Needed Route: XX  PRN Reason: Other  Start: 09/24/24 1353     Admin Instructions:   Open Order & Select \"[facility] Electrolyte Replacement Protocol Algorithm\" to View Details          Potassium Replacement - Follow Nurse / BPA Driven Protocol  Freq: As Needed Route: XX  PRN Reason: Other  Start: 09/23/24 1158     Admin Instructions:   Open Order & Select \"[facility] Electrolyte Replacement Protocol Algorithm\" to View Details          sodium chloride 0.9 % flush 10 mL  Dose: 10 mL  Freq: As Needed Route: IV  PRN Reason: Line Care  Start: 09/23/24 1806          sodium chloride 0.9 % flush 10 mL  Dose: 10 mL  Freq: Every 12 Hours Scheduled Route: IV  Start: 09/23/24 2100      2116-Given            0814-Given     2100             sodium chloride 0.9 % infusion 40 mL  Dose: 40 mL  Freq: As Needed Route: IV  PRN Reason: Line Care  Start: 09/23/24 1806     Admin Instructions:   Following administration of an IV intermittent medication, flush line with 40mL NS at 100mL/hr.          valsartan (DIOVAN) tablet 160 mg  Dose: 160 mg  Freq: Every 24 Hours Scheduled Route: PO  Start: 09/24/24 1530     Admin Instructions:   Hold for SBP less than 100, DBP less than 60, or heart rate less than 50. If a dose is held, please contact the provider.       1618-Given              Completed Medications  Medications 09/23/24 09/24/24      iopamidol (ISOVUE-300) 61 % injection 100 mL  Dose: 100 mL  Freq: Once in Imaging Route: IV  Start: 09/23/24 1258 End: 09/23/24 1242      1242-Given by Other               potassium chloride (K-DUR,KLOR-CON) ER tablet 40 mEq  Dose: 40 mEq  Freq: " Every 2 Hours Route: PO  Start: 09/24/24 0745 End: 09/24/24 1335     Admin Instructions:   Swallow whole; do not crush, split, or chew.       0814-Given     1027-Given     1335-Given            potassium chloride (K-DUR,KLOR-CON) ER tablet 40 mEq  Dose: 40 mEq  Freq: Every 4 Hours Route: PO  Start: 09/23/24 1316 End: 09/23/24 2115     Admin Instructions:   Do not crush or chew the capsules or tablets. The drug may not work as designed if the capsule or tablet is crushed or chewed. Swallow whole.  Swallow whole; do not crush, split, or chew.      1304-Given     1822-Given     2115-Given     2219-Canceled Entry            sodium chloride 0.9 % bolus 1,000 mL  Dose: 1,000 mL  Freq: Once Route: IV  Start: 09/23/24 1214 End: 09/23/24 1329      1212-New Bag     1329-Stopped              Discontinued Medications  Medications 09/23/24 09/24/24      cloNIDine (CATAPRES) tablet 0.1 mg  Dose: 0.1 mg  Freq: 2 Times Daily Route: PO  Start: 09/23/24 2100 End: 09/24/24 0659     Admin Instructions:   Hold for SBP less than 100, DBP less than 60, or heart rate less than 50. If a dose is held, please contact the provider.  Caution: Look alike/sound alike drug alert.      2115-Given               lactated ringers 980 mL with potassium chloride 40 mEq infusion  Rate: 125 mL/hr  Freq: Continuous Route: IV  Start: 09/24/24 0745 End: 09/24/24 1534       0838-New Bag              lisinopril (PRINIVIL,ZESTRIL) tablet 20 mg  Dose: 20 mg  Freq: Daily Route: PO  Start: 09/23/24 2100 End: 09/23/24 1912     Admin Instructions:   Hold for SBP less than 100, DBP less than 60.          potassium chloride (K-DUR,KLOR-CON) ER tablet 40 mEq  Dose: 40 mEq  Freq: Every 4 Hours Route: PO  Start: 09/24/24 0345 End: 09/24/24 0658     Admin Instructions:   Do not crush or chew the capsules or tablets. The drug may not work as designed if the capsule or tablet is crushed or chewed. Swallow whole.  Swallow whole; do not crush, split, or chew.        0349-Given              sodium chloride 0.9 % infusion  Rate: 75 mL/hr Dose: 75 mL/hr  Freq: Continuous Route: IV  Start: 24 End: 24 0658      1826-New Bag                                Physician Progress Notes (last 48 hours)        Pro Jordan MD at 24 1528              [facility] Progress Note      Patient Name: Vadim Elder  : 1970  MRN: [id number]  Primary Care Physician:  Shiva Gutierrez MD  Date of admission: 2024    Subjective     Interval History:     Patient lying in bed feeling comfortable   Denies any chest pain or palpitations  Tolerating oral intake  No nausea or emesis    Urinary spontaneously    Review of Systems:   As noted above    Objective     Vitals:   Temp:  [97.3 °F (36.3 °C)-98.2 °F (36.8 °C)] 98.2 °F (36.8 °C)  Heart Rate:  [70-79] 79  Resp:  [16] 16  BP: (104-172)/(48-96) 161/82    Intake/Output Summary (Last 24 hours) at 2024 1528  Last data filed at 2024 1403  Gross per 24 hour   Intake 1040 ml   Output 2250 ml   Net -1210 ml       Physical Exam:    General Appearance: alert, oriented x 3, no acute distress   Skin: warm and dry  HEENT: oral mucosa normal, nonicteric sclera  Neck: supple, no JVD  Lungs: CTA  Heart: RRR, normal S1 and S2  Abdomen: soft, nontender, nondistended  : no palpable bladder  Extremities: no edema, cyanosis or clubbing  Neuro: normal speech and mental status     Scheduled Meds:     carvedilol, 12.5 mg, Oral, BID  nicotine, 1 patch, Transdermal, Q24H  sodium chloride, 10 mL, Intravenous, Q12H  valsartan, 160 mg, Oral, Q24H      IV Meds:   lactated ringers 980 mL with potassium chloride 40 mEq infusion, , Last Rate: 125 mL/hr at 24 0838        Results Reviewed:   I have personally reviewed the results from the time of this admission to 2024 15:28 EDT     Results from last 7 days   Lab Units 24  1252 24  1148 24  0148 24  1554 24  1111    SODIUM mmol/L 127*  --  124* 122* 119*   POTASSIUM mmol/L 4.1 4.0 3.4* 3.0* 2.7*   CHLORIDE mmol/L 95*  --  90* 85* 82*   CO2 mmol/L 24.9  --  24.4 26.0 27.3   BUN mg/dL 10  --  12 14 14   CREATININE mg/dL 0.91  --  0.97 1.21 1.61*   CALCIUM mg/dL 9.2  --  8.6 9.0 8.6   BILIRUBIN mg/dL  --   --   --   --  0.6   ALK PHOS U/L  --   --   --   --  53   ALT (SGPT) U/L  --   --   --   --  20   AST (SGOT) U/L  --   --   --   --  27   GLUCOSE mg/dL 82  --  94 105* 117*       Estimated Creatinine Clearance: 101.2 mL/min (by C-G formula based on SCr of 0.91 mg/dL).    Results from last 7 days   Lab Units 09/24/24  0148 09/23/24  1554 09/23/24  1111   MAGNESIUM mg/dL  --   --  2.2   PHOSPHORUS mg/dL 3.1 3.4 3.3       Results from last 7 days   Lab Units 09/23/24  1111   URIC ACID mg/dL 4.6       Results from last 7 days   Lab Units 09/23/24  1111   WBC 10*3/mm3 9.94   HEMOGLOBIN g/dL 15.2   PLATELETS 10*3/mm3 226             Assessment / Plan     ASSESSMENT:    -Acute hypovolemic hyponatremia likely secondary to chlorthalidone accompanied with increased GI losses and increased free water intake , initial urine sodium of 22 after receiving 1 L of NS.  Her blood pressure has dramatically improved waiting for repeat renal panel, urine osmolarity, and serum osmolarity.  Likely will continue gentle hydration,  -Acute kidney injury likely secondary to prerenal state from increased GI losses accompanied with chlorthalidone and lisinopril affecting renal autoregulation associated with chronic use of NSAIDs currently managed with IV fluids.   -Chronic hypokalemia.  Has been between 3.1 and 3.3 for the last 4 years the patient has been on chlorthalidone that could be a contributing factor.  But concerning for hyperaldosteronism state.  Patient could benefit from K sparing agent during admission .  Continue oral KCl replacement magnesium levels stable at 2.2,  -Diarrhea with increased GI losses on replacement  -Hypertension initially  hypotensive in the ER holding antihypertensive medications will resume gradually  -Tobacco abuse counseling provided in Bulgarian       PLAN:  Patient had responded to IV fluid challenge his sodium is gradually improving as well as his hemodynamics  Will restart patient on antihypertensive medications instead of lisinopril will add an increased dose of valsartan.  Will suggest to discontinue chlorthalidone.   Upon discharge patient can be followed closely in renal clinic for further titration of his antihypertensive medications  Will continue surveillance labs    Discussed with Dr Carlton     Thank you for involving us in the care of Vadim Elder.  Please feel free to call with any questions.    Encounter held in Bulgarian patient verbalized understanding    Pro Jordan MD  24  15:28 EDT    [facility]  [phone]    Please note that portions of this note were completed with a voice recognition program.      Electronically signed by Pro Jordan MD at 24 1536       Jung Carlton DO at 24 1015              Name: Vaidm Elder ADMIT: 2024   : 1970  PCP: Shiva Gutierrez MD    MRN: [id number] LOS: 1 days   AGE/SEX: 54 y.o. male  ROOM: [facility]     Subjective   Subjective   Patient seen and examined this morning.  Hospital day 1.   services used at bedside, patient endorses some fatigue, ongoing intermittent dizziness, otherwise no acute complaints.      Objective   Objective   Vital Signs  Temp:  [97.3 °F (36.3 °C)-98.2 °F (36.8 °C)] 98.2 °F (36.8 °C)  Heart Rate:  [70-79] 79  Resp:  [16] 16  BP: (104-172)/(48-96) 161/82  SpO2:  [94 %-100 %] 99 %  on   ;   Device (Oxygen Therapy): room air  Body mass index is 31.74 kg/m².  Physical Exam  Vitals and nursing note reviewed.   Constitutional:       General: He is awake. He is not in acute distress.  HENT:      Head: Atraumatic.   Cardiovascular:      Rate and Rhythm: Normal rate and regular  "rhythm.      Pulses: Normal pulses.      Heart sounds: Normal heart sounds.   Pulmonary:      Effort: Pulmonary effort is normal. No respiratory distress.      Breath sounds: Normal breath sounds.   Abdominal:      Palpations: Abdomen is soft.      Tenderness: There is no abdominal tenderness.   Musculoskeletal:      Right lower leg: No edema.      Left lower leg: No edema.   Skin:     General: Skin is warm and dry.   Neurological:      Mental Status: He is alert.   Psychiatric:         Behavior: Behavior is cooperative.       Results Review     I reviewed the patient's new clinical results.  Results from last 7 days   Lab Units 09/23/24  1111   WBC 10*3/mm3 9.94   HEMOGLOBIN g/dL 15.2   PLATELETS 10*3/mm3 226     Results from last 7 days   Lab Units 09/24/24  1148 09/24/24  0148 09/23/24  1554 09/23/24  1111   SODIUM mmol/L  --  124* 122* 119*   POTASSIUM mmol/L 4.0 3.4* 3.0* 2.7*   CHLORIDE mmol/L  --  90* 85* 82*   CO2 mmol/L  --  24.4 26.0 27.3   BUN mg/dL  --  12 14 14   CREATININE mg/dL  --  0.97 1.21 1.61*   GLUCOSE mg/dL  --  94 105* 117*   EGFR mL/min/1.73  --  91.6 70.3 49.9*     Results from last 7 days   Lab Units 09/24/24  0148 09/23/24  1554 09/23/24  1111   ALBUMIN g/dL 3.6 3.9 3.6   BILIRUBIN mg/dL  --   --  0.6   ALK PHOS U/L  --   --  53   AST (SGOT) U/L  --   --  27   ALT (SGPT) U/L  --   --  20     Results from last 7 days   Lab Units 09/24/24  0148 09/23/24  1554 09/23/24  1111   CALCIUM mg/dL 8.6 9.0 8.6   ALBUMIN g/dL 3.6 3.9 3.6   MAGNESIUM mg/dL  --   --  2.2   PHOSPHORUS mg/dL 3.1 3.4 3.3       No results found for: \"HGBA1C\", \"POCGLU\"    CT Abdomen Pelvis With Contrast    Result Date: 9/23/2024   1. No acute findings identified in the abdomen or pelvis. 2. Moderate bladder distention. Clinical correlation. 3. Nonobstructing nephrolithiasis.  This report was finalized on 9/23/2024 1:19 PM by Dr. Juan Vasquez M.D on Workstation: [id number]      CT Head Without Contrast    Result Date: " 9/23/2024  No acute process is identified. Further evaluation could be performed with a MRI examination of the brain as indicated.      Radiation dose reduction techniques were utilized, including automated exposure modulation based on body size.  This report was finalized on 9/23/2024 1:00 PM by Dr. Fady Merchant M.D on Workstation: [id number]       I have personally reviewed all medications:  Scheduled Medications  carvedilol, 12.5 mg, Oral, BID  nicotine, 1 patch, Transdermal, Q24H  sodium chloride, 10 mL, Intravenous, Q12H    Infusions  lactated ringers 980 mL with potassium chloride 40 mEq infusion, , Last Rate: 125 mL/hr at 09/24/24 0838    Diet  Diet: Regular/House; Fluid Consistency: Thin (IDDSI 0)    I have personally reviewed:  [x]  Laboratory   []  Microbiology   [x]  Radiology   [x]  EKG/Telemetry  []  Cardiology/Vascular   []  Pathology    []  Records      Assessment/Plan     Active Hospital Problems    Diagnosis  POA    **Hyponatremia [E87.1]  Yes    Acute kidney injury [N17.9]  Yes    Hypokalemia [E87.6]  Yes    Elevated troponin [R79.89]  Yes    Obesity (BMI 30-39.9) [E66.9]  Yes    Essential hypertension [I10]  Yes    Nephrolithiasis [N20.0]  Yes    Dizziness [R42]  Yes    Dehydration [E86.0]  Yes      Resolved Hospital Problems   No resolved problems to display.       54 y.o. male admitted with Hyponatremia.    Hyponatremia  - Likely hypovolemic, urine studies, TSH, uric acid reviewed. Patient receiving IV fluids, values remain low on most recent labs, slightly improved from prior.  - Nephrology consulted and following. Will continue to follow their plans/recommendations. Greatly appreciate their help.  - Continue treatments as ordered for now.  - Order repeat BMP in AM for reassessment.    Acute kidney injury  Dehydration  - Likely secondary to volume depletion from decreased intake, possible GI losses given endorsement of loose stools, coupled with possible side effects of medications. He is  receiving IV fluids, values are improving. No evidence of urinary retention, but will monitor with bladder scans per acute urinary retention protocol.  - Continue current treatments, repeat labs in AM.    Dizziness  - Likely due to volume depletion, CT head negative. Check orthostatics.    Hypokalemia  - K low on most recent labs; Will replete via electrolyte protocol. Follow up repeat labs to guide ongoing management decisions. Replete PRN per protocol. Continue to monitor.  - Check magnesium level.    Hypertension  - Blood pressure appears stable and acceptable acutely at this time. No indications to warrant acute changes/intervention at this time.  - Continue current medications as prescribed. Trend BP to guide ongoing management decisions.    Elevated troponin  - Noted on arrival, mild increase, no evidence of ACS, likely 2/2 acute kidney injury.  - Monitor on telemetry.    Nephrolithiasis  - Noted on imaging, monitor for signs of urinary retention.    Obesity  - BMI 31.74 kg/m2. Complicating all medical problems.      SCDs for DVT prophylaxis.  Full code.  Discussed with patient, nursing staff, consulting provider, CCP, and care team on multidisciplinary rounds.  Anticipate discharge home later this week.  Expected Discharge Date: 2024; Expected Discharge Time:       Jung Carlton DO  [facility]  24        Electronically signed by Jung Carlton DO at 24 1400          Consult Notes (last 48 hours)        Pro Jordan MD at 24 1507        Consult Orders    1. Inpatient Nephrology Consult [[id number]] ordered by Pro Jordan MD at 24 1349    2. Nephrology (on -call MD unless specified) [[id number]] ordered by Andrew Magdaleno MD at 24 1336                   [facility] Consult Note      Patient Name: Vadim Elder  : 1970  MRN: [id number]  Primary Care Physician:  Provider, No Known  Referring Physician: No  ref. provider found  Date of admission: 9/23/2024    Subjective     Reason for Consult: Acute kidney injury with hyponatremia    HPI:   Vadim Elder is a 54 y.o. male originally from [place] English speaker who works in air conditioning, active smoker 1 pack a day for many years, hypertension currently controlled on carvedilol, lisinopril, clonidine and chlorthalidone with chronic hypokalemia replacement followed by PCP  ( his potassium has been chronically low for the last 4 years  3.1 to 3.3 ).  Diffuse osteoarthritis with chronic Advil and naproxen use usually 3-4 times a week     The patient states that he has been drinking a fair amount of fluids due to onset of loose bowel movements and lightheadedness he has been taking his medication instructed and this morning had an episode of lightheadedness EMS was called and brought to the emergency department where he was found hypotensive initial workup found to have acute hyponatremia in the 119.  Patient has initially managed with 1 L of NS .    Nephrology consultation requested for the management    Encounter held in English    Review of Systems:   14 point review of systems is otherwise negative except for mentioned above on HPI    Personal History     History reviewed. No pertinent past medical history.    History reviewed. No pertinent surgical history.    Family History: family history is not on file.    Social History:  reports current alcohol use of about 12.0 standard drinks of alcohol per week.    Home Medications:  Prior to Admission medications    Medication Sig Start Date End Date Taking? Authorizing Provider   carvedilol (COREG) 12.5 MG tablet Take 1 tablet by mouth 2 (Two) Times a Day.   Yes ProviderSuresh MD   chlorthalidone (HYGROTON) 25 MG tablet Take 1 tablet by mouth Daily.   Yes ProviderSuresh MD   cloNIDine (CATAPRES) 0.1 MG tablet Take 1 tablet by mouth 2 (Two) Times a Day.   Yes ProviderSuresh MD   lisinopril  (PRINIVIL,ZESTRIL) 20 MG tablet Take 1 tablet by mouth Daily.   Yes Provider, MD Suresh   potassium chloride ER (K-TAB) 20 MEQ tablet controlled-release ER tablet Take 1 tablet by mouth 2 (Two) Times a Day.   Yes Provider, MD Suresh       Allergies:  No Known Allergies    Objective     Vitals:   Temp:  [98.5 °F (36.9 °C)] 98.5 °F (36.9 °C)  Heart Rate:  [66-78] 78  Resp:  [16] 16  BP: (105-155)/(55-94) 155/94    Intake/Output Summary (Last 24 hours) at 9/23/2024 1507  Last data filed at 9/23/2024 1443  Gross per 24 hour   Intake 1700 ml   Output 400 ml   Net 1300 ml       Physical Exam:   Constitutional: Awake, alert, no acute distress.  HEENT: Sclera anicteric, no conjunctival injection  Neck: Supple, no thyromegaly, no lymphadenopathy, trachea at midline, no JVD  Respiratory: Clear to auscultation bilaterally, nonlabored respiration  Cardiovascular: RRR, no murmurs, no rubs or gallops, no carotid bruit  Gastrointestinal: Positive bowel sounds, abdomen is soft, nontender and nondistended  : No palpable bladder  Musculoskeletal: No edema, no clubbing or cyanosis  Psychiatric: Appropriate affect, cooperative  Neurologic: Oriented x3, moving all extremities, normal speech and mental status  Skin: Warm and dry       Scheduled Meds:     nicotine, 1 patch, Transdermal, Q24H  potassium chloride ER, 40 mEq, Oral, Q4H      IV Meds:        Results Reviewed:   I have personally reviewed the results from the time of this admission to 9/23/2024 15:07 EDT     Lab Results   Component Value Date    GLUCOSE 117 (H) 09/23/2024    CALCIUM 8.6 09/23/2024     (C) 09/23/2024    K 2.7 (L) 09/23/2024    CO2 27.3 09/23/2024    CL 82 (L) 09/23/2024    BUN 14 09/23/2024    CREATININE 1.61 (H) 09/23/2024    EGFRIFAFRI >60 08/31/2022    BCR 8.7 09/23/2024    ANIONGAP 9.7 09/23/2024      Lab Results   Component Value Date    MG 2.2 09/23/2024    PHOS 3.3 09/23/2024    ALBUMIN 3.6 09/23/2024           Assessment / Plan        Hyponatremia      ASSESSMENT:    -Acute hypovolemic hyponatremia likely secondary to chlorthalidone accompanied with increased GI losses and increased free water intake , initial urine sodium of 22 after receiving 1 L of NS.  Her blood pressure has dramatically improved waiting for repeat renal panel, urine osmolarity, and serum osmolarity.  Likely will continue gentle hydration,  -Acute kidney injury likely secondary to prerenal state from increased GI losses accompanied with chlorthalidone and lisinopril affecting renal autoregulation associated with chronic use of NSAIDs currently managed with IV fluids.   -Chronic hypokalemia.  Has been between 3.1 and 3.3 for the last 4 years the patient has been on chlorthalidone that could be a contributing factor.  But concerning for hyperaldosteronism state.  Patient could benefit from K sparing agent during admission .  Continue oral KCl replacement magnesium levels stable at 2.2,  -Diarrhea with increased GI losses on replacement  -Hypertension initially hypotensive in the ER holding antihypertensive medications will resume gradually  -Tobacco abuse counseling provided in Ukrainian    PLAN:  -Awaiting repeat renal panel and a urine studies to determine the course of action to follow likely continue diuretics and continue KCl replacement  -Continue surveillance labs    Encounter held in Ukrainian    Thank you for involving us in the care of Vadim Elder.  Please feel free to call with any questions.    Pro Jordan MD  09/23/24  15:07 EDT    [facility]  [phone]      Please note that portions of this note were completed with a voice recognition program.      Electronically signed by Pro Jordan MD at 09/23/24 5007

## 2024-09-24 NOTE — PROGRESS NOTES
Name: Vadim Elder ADMIT: 2024   : 1970  PCP: Shiva Gutierrez MD    MRN: [id number] LOS: 1 days   AGE/SEX: 54 y.o. male  ROOM: [facility]     Subjective   Subjective   Patient seen and examined this morning.  Hospital day 1.   services used at bedside, patient endorses some fatigue, ongoing intermittent dizziness, otherwise no acute complaints.       Objective   Objective   Vital Signs  Temp:  [97.3 °F (36.3 °C)-98.2 °F (36.8 °C)] 98.2 °F (36.8 °C)  Heart Rate:  [70-79] 79  Resp:  [16] 16  BP: (104-172)/(48-96) 161/82  SpO2:  [94 %-100 %] 99 %  on   ;   Device (Oxygen Therapy): room air  Body mass index is 31.74 kg/m².  Physical Exam  Vitals and nursing note reviewed.   Constitutional:       General: He is awake. He is not in acute distress.  HENT:      Head: Atraumatic.   Cardiovascular:      Rate and Rhythm: Normal rate and regular rhythm.      Pulses: Normal pulses.      Heart sounds: Normal heart sounds.   Pulmonary:      Effort: Pulmonary effort is normal. No respiratory distress.      Breath sounds: Normal breath sounds.   Abdominal:      Palpations: Abdomen is soft.      Tenderness: There is no abdominal tenderness.   Musculoskeletal:      Right lower leg: No edema.      Left lower leg: No edema.   Skin:     General: Skin is warm and dry.   Neurological:      Mental Status: He is alert.   Psychiatric:         Behavior: Behavior is cooperative.       Results Review     I reviewed the patient's new clinical results.  Results from last 7 days   Lab Units 24  1111   WBC 10*3/mm3 9.94   HEMOGLOBIN g/dL 15.2   PLATELETS 10*3/mm3 226     Results from last 7 days   Lab Units 24  1148 24  0148 24  1554 24  1111   SODIUM mmol/L  --  124* 122* 119*   POTASSIUM mmol/L 4.0 3.4* 3.0* 2.7*   CHLORIDE mmol/L  --  90* 85* 82*   CO2 mmol/L  --  24.4 26.0 27.3   BUN mg/dL  --  12 14 14   CREATININE mg/dL  --  0.97 1.21 1.61*   GLUCOSE mg/dL  --  94 105* 117*   EGFR  "mL/min/1.73  --  91.6 70.3 49.9*     Results from last 7 days   Lab Units 09/24/24  0148 09/23/24  1554 09/23/24  1111   ALBUMIN g/dL 3.6 3.9 3.6   BILIRUBIN mg/dL  --   --  0.6   ALK PHOS U/L  --   --  53   AST (SGOT) U/L  --   --  27   ALT (SGPT) U/L  --   --  20     Results from last 7 days   Lab Units 09/24/24  0148 09/23/24  1554 09/23/24  1111   CALCIUM mg/dL 8.6 9.0 8.6   ALBUMIN g/dL 3.6 3.9 3.6   MAGNESIUM mg/dL  --   --  2.2   PHOSPHORUS mg/dL 3.1 3.4 3.3       No results found for: \"HGBA1C\", \"POCGLU\"    CT Abdomen Pelvis With Contrast    Result Date: 9/23/2024   1. No acute findings identified in the abdomen or pelvis. 2. Moderate bladder distention. Clinical correlation. 3. Nonobstructing nephrolithiasis.  This report was finalized on 9/23/2024 1:19 PM by Dr. Juan Vasquez M.D on Workstation: [id number]      CT Head Without Contrast    Result Date: 9/23/2024  No acute process is identified. Further evaluation could be performed with a MRI examination of the brain as indicated.      Radiation dose reduction techniques were utilized, including automated exposure modulation based on body size.  This report was finalized on 9/23/2024 1:00 PM by Dr. Fady Merchant M.D on Workstation: [id number]       I have personally reviewed all medications:  Scheduled Medications  carvedilol, 12.5 mg, Oral, BID  nicotine, 1 patch, Transdermal, Q24H  sodium chloride, 10 mL, Intravenous, Q12H    Infusions  lactated ringers 980 mL with potassium chloride 40 mEq infusion, , Last Rate: 125 mL/hr at 09/24/24 0838    Diet  Diet: Regular/House; Fluid Consistency: Thin (IDDSI 0)    I have personally reviewed:  [x]  Laboratory   []  Microbiology   [x]  Radiology   [x]  EKG/Telemetry  []  Cardiology/Vascular   []  Pathology    []  Records       Assessment/Plan     Active Hospital Problems    Diagnosis  POA    **Hyponatremia [E87.1]  Yes    Acute kidney injury [N17.9]  Yes    Hypokalemia [E87.6]  Yes    Elevated troponin " [R79.89]  Yes    Obesity (BMI 30-39.9) [E66.9]  Yes    Essential hypertension [I10]  Yes    Nephrolithiasis [N20.0]  Yes    Dizziness [R42]  Yes    Dehydration [E86.0]  Yes      Resolved Hospital Problems   No resolved problems to display.       54 y.o. male admitted with Hyponatremia.    Hyponatremia  - Likely hypovolemic, urine studies, TSH, uric acid reviewed. Patient receiving IV fluids, values remain low on most recent labs, slightly improved from prior.  - Nephrology consulted and following. Will continue to follow their plans/recommendations. Greatly appreciate their help.  - Continue treatments as ordered for now.  - Order repeat BMP in AM for reassessment.    Acute kidney injury  Dehydration  - Likely secondary to volume depletion from decreased intake, possible GI losses given endorsement of loose stools, coupled with possible side effects of medications. He is receiving IV fluids, values are improving. No evidence of urinary retention, but will monitor with bladder scans per acute urinary retention protocol.  - Continue current treatments, repeat labs in AM.    Dizziness  - Likely due to volume depletion, CT head negative. Check orthostatics.    Hypokalemia  - K low on most recent labs; Will replete via electrolyte protocol. Follow up repeat labs to guide ongoing management decisions. Replete PRN per protocol. Continue to monitor.  - Check magnesium level.    Hypertension  - Blood pressure appears stable and acceptable acutely at this time. No indications to warrant acute changes/intervention at this time.  - Continue current medications as prescribed. Trend BP to guide ongoing management decisions.    Elevated troponin  - Noted on arrival, mild increase, no evidence of ACS, likely 2/2 acute kidney injury.  - Monitor on telemetry.    Nephrolithiasis  - Noted on imaging, monitor for signs of urinary retention.    Obesity  - BMI 31.74 kg/m2. Complicating all medical problems.      SCDs for DVT  prophylaxis.  Full code.  Discussed with patient, nursing staff, consulting provider, CCP, and care team on multidisciplinary rounds.  Anticipate discharge home later this week.  Expected Discharge Date: 9/25/2024; Expected Discharge Time:       DO [facility]  09/24/24

## 2024-09-24 NOTE — PLAN OF CARE
Goal Outcome Evaluation:      Pt resting in bed at this time. VSS. On RA. Pt complaint about dizziness after shower. NSR on tele. Plan of care ongoing.

## 2024-09-25 ENCOUNTER — APPOINTMENT (OUTPATIENT)
Dept: CARDIOLOGY | Facility: HOSPITAL | Age: 54
End: 2024-09-25
Payer: COMMERCIAL

## 2024-09-25 VITALS
RESPIRATION RATE: 20 BRPM | SYSTOLIC BLOOD PRESSURE: 180 MMHG | HEART RATE: 72 BPM | WEIGHT: 208 LBS | HEIGHT: 68 IN | BODY MASS INDEX: 31.52 KG/M2 | DIASTOLIC BLOOD PRESSURE: 109 MMHG | OXYGEN SATURATION: 100 % | TEMPERATURE: 97.9 F

## 2024-09-25 PROBLEM — R94.31 ABNORMAL EKG: Status: ACTIVE | Noted: 2024-09-25

## 2024-09-25 LAB
ANION GAP SERPL CALCULATED.3IONS-SCNC: 10 MMOL/L (ref 5–15)
ASCENDING AORTA: 3.2 CM
BASOPHILS # BLD AUTO: 0.07 10*3/MM3 (ref 0–0.2)
BASOPHILS NFR BLD AUTO: 0.7 % (ref 0–1.5)
BH CV ECHO LEFT VENTRICLE GLOBAL LONGITUDINAL STRAIN: -19.5 %
BH CV ECHO MEAS - ACS: 1.96 CM
BH CV ECHO MEAS - AO MAX PG: 8.1 MMHG
BH CV ECHO MEAS - AO MEAN PG: 4.1 MMHG
BH CV ECHO MEAS - AO ROOT DIAM: 3.6 CM
BH CV ECHO MEAS - AO V2 MAX: 142.7 CM/SEC
BH CV ECHO MEAS - AO V2 VTI: 29.4 CM
BH CV ECHO MEAS - AVA(I,D): 2.6 CM2
BH CV ECHO MEAS - EDV(CUBED): 154.6 ML
BH CV ECHO MEAS - EDV(MOD-SP2): 104 ML
BH CV ECHO MEAS - EDV(MOD-SP4): 119 ML
BH CV ECHO MEAS - EF(MOD-BP): 61 %
BH CV ECHO MEAS - EF(MOD-SP2): 63.5 %
BH CV ECHO MEAS - EF(MOD-SP4): 56.3 %
BH CV ECHO MEAS - ESV(CUBED): 65.3 ML
BH CV ECHO MEAS - ESV(MOD-SP2): 38 ML
BH CV ECHO MEAS - ESV(MOD-SP4): 52 ML
BH CV ECHO MEAS - FS: 24.9 %
BH CV ECHO MEAS - IVS/LVPW: 0.99 CM
BH CV ECHO MEAS - IVSD: 1.34 CM
BH CV ECHO MEAS - LAT PEAK E' VEL: 5.9 CM/SEC
BH CV ECHO MEAS - LV MASS(C)D: 306.8 GRAMS
BH CV ECHO MEAS - LV MAX PG: 5.8 MMHG
BH CV ECHO MEAS - LV MEAN PG: 2.6 MMHG
BH CV ECHO MEAS - LV V1 MAX: 120.2 CM/SEC
BH CV ECHO MEAS - LV V1 VTI: 22 CM
BH CV ECHO MEAS - LVIDD: 5.4 CM
BH CV ECHO MEAS - LVIDS: 4 CM
BH CV ECHO MEAS - LVOT AREA: 3.4 CM2
BH CV ECHO MEAS - LVOT DIAM: 2.09 CM
BH CV ECHO MEAS - LVPWD: 1.35 CM
BH CV ECHO MEAS - MED PEAK E' VEL: 5.2 CM/SEC
BH CV ECHO MEAS - MV A DUR: 0.13 SEC
BH CV ECHO MEAS - MV A MAX VEL: 52.9 CM/SEC
BH CV ECHO MEAS - MV DEC SLOPE: 424.1 CM/SEC2
BH CV ECHO MEAS - MV DEC TIME: 0.21 SEC
BH CV ECHO MEAS - MV E MAX VEL: 68.7 CM/SEC
BH CV ECHO MEAS - MV E/A: 1.3
BH CV ECHO MEAS - MV MAX PG: 3 MMHG
BH CV ECHO MEAS - MV MEAN PG: 1.66 MMHG
BH CV ECHO MEAS - MV P1/2T: 58.8 MSEC
BH CV ECHO MEAS - MV V2 VTI: 22.6 CM
BH CV ECHO MEAS - MVA(P1/2T): 3.7 CM2
BH CV ECHO MEAS - MVA(VTI): 3.3 CM2
BH CV ECHO MEAS - PA ACC TIME: 0.19 SEC
BH CV ECHO MEAS - PA V2 MAX: 91.3 CM/SEC
BH CV ECHO MEAS - PULM A REVS DUR: 0.1 SEC
BH CV ECHO MEAS - PULM A REVS VEL: 25.9 CM/SEC
BH CV ECHO MEAS - PULM DIAS VEL: 38.7 CM/SEC
BH CV ECHO MEAS - PULM S/D: 1.23
BH CV ECHO MEAS - PULM SYS VEL: 47.5 CM/SEC
BH CV ECHO MEAS - RAP SYSTOLE: 3 MMHG
BH CV ECHO MEAS - RV MAX PG: 1.83 MMHG
BH CV ECHO MEAS - RV V1 MAX: 67.6 CM/SEC
BH CV ECHO MEAS - RV V1 VTI: 17.3 CM
BH CV ECHO MEAS - RVSP: 23 MMHG
BH CV ECHO MEAS - SV(LVOT): 75.6 ML
BH CV ECHO MEAS - SV(MOD-SP2): 66 ML
BH CV ECHO MEAS - SV(MOD-SP4): 67 ML
BH CV ECHO MEAS - TAPSE (>1.6): 2.19 CM
BH CV ECHO MEAS - TR MAX PG: 20.5 MMHG
BH CV ECHO MEAS - TR MAX VEL: 226.2 CM/SEC
BH CV ECHO MEASUREMENTS AVERAGE E/E' RATIO: 12.38
BH CV XLRA - RV BASE: 3.5 CM
BH CV XLRA - RV LENGTH: 7.1 CM
BH CV XLRA - RV MID: 2.9 CM
BH CV XLRA - TDI S': 13.2 CM/SEC
BUN SERPL-MCNC: 10 MG/DL (ref 6–20)
BUN/CREAT SERPL: 11.9 (ref 7–25)
CALCIUM SPEC-SCNC: 9 MG/DL (ref 8.6–10.5)
CHLORIDE SERPL-SCNC: 97 MMOL/L (ref 98–107)
CO2 SERPL-SCNC: 23 MMOL/L (ref 22–29)
CREAT SERPL-MCNC: 0.84 MG/DL (ref 0.76–1.27)
DEPRECATED RDW RBC AUTO: 41.6 FL (ref 37–54)
EGFRCR SERPLBLD CKD-EPI 2021: 102.3 ML/MIN/1.73
EOSINOPHIL # BLD AUTO: 0.27 10*3/MM3 (ref 0–0.4)
EOSINOPHIL NFR BLD AUTO: 2.8 % (ref 0.3–6.2)
ERYTHROCYTE [DISTWIDTH] IN BLOOD BY AUTOMATED COUNT: 12.3 % (ref 12.3–15.4)
GLUCOSE SERPL-MCNC: 98 MG/DL (ref 65–99)
HCT VFR BLD AUTO: 43.3 % (ref 37.5–51)
HGB BLD-MCNC: 15.1 G/DL (ref 13–17.7)
IMM GRANULOCYTES # BLD AUTO: 0.05 10*3/MM3 (ref 0–0.05)
IMM GRANULOCYTES NFR BLD AUTO: 0.5 % (ref 0–0.5)
LEFT ATRIUM VOLUME INDEX: 36 ML/M2
LYMPHOCYTES # BLD AUTO: 2.01 10*3/MM3 (ref 0.7–3.1)
LYMPHOCYTES NFR BLD AUTO: 20.6 % (ref 19.6–45.3)
MCH RBC QN AUTO: 31.9 PG (ref 26.6–33)
MCHC RBC AUTO-ENTMCNC: 34.9 G/DL (ref 31.5–35.7)
MCV RBC AUTO: 91.5 FL (ref 79–97)
MONOCYTES # BLD AUTO: 0.91 10*3/MM3 (ref 0.1–0.9)
MONOCYTES NFR BLD AUTO: 9.3 % (ref 5–12)
NEUTROPHILS NFR BLD AUTO: 6.44 10*3/MM3 (ref 1.7–7)
NEUTROPHILS NFR BLD AUTO: 66.1 % (ref 42.7–76)
NRBC BLD AUTO-RTO: 0 /100 WBC (ref 0–0.2)
PLATELET # BLD AUTO: 215 10*3/MM3 (ref 140–450)
PMV BLD AUTO: 9.6 FL (ref 6–12)
POTASSIUM SERPL-SCNC: 3.7 MMOL/L (ref 3.5–5.2)
RBC # BLD AUTO: 4.73 10*6/MM3 (ref 4.14–5.8)
SINUS: 3 CM
SODIUM SERPL-SCNC: 130 MMOL/L (ref 136–145)
STJ: 3 CM
WBC NRBC COR # BLD AUTO: 9.75 10*3/MM3 (ref 3.4–10.8)

## 2024-09-25 PROCEDURE — 99222 1ST HOSP IP/OBS MODERATE 55: CPT | Performed by: INTERNAL MEDICINE

## 2024-09-25 PROCEDURE — 93306 TTE W/DOPPLER COMPLETE: CPT | Performed by: INTERNAL MEDICINE

## 2024-09-25 PROCEDURE — 80048 BASIC METABOLIC PNL TOTAL CA: CPT | Performed by: STUDENT IN AN ORGANIZED HEALTH CARE EDUCATION/TRAINING PROGRAM

## 2024-09-25 PROCEDURE — 93306 TTE W/DOPPLER COMPLETE: CPT

## 2024-09-25 PROCEDURE — 85025 COMPLETE CBC W/AUTO DIFF WBC: CPT | Performed by: STUDENT IN AN ORGANIZED HEALTH CARE EDUCATION/TRAINING PROGRAM

## 2024-09-25 RX ORDER — SPIRONOLACTONE 25 MG/1
25 TABLET ORAL DAILY
Status: DISCONTINUED | OUTPATIENT
Start: 2024-09-25 | End: 2024-09-25 | Stop reason: HOSPADM

## 2024-09-25 RX ORDER — TERAZOSIN 1 MG/1
1 CAPSULE ORAL NIGHTLY
Status: DISCONTINUED | OUTPATIENT
Start: 2024-09-25 | End: 2024-09-25

## 2024-09-25 RX ORDER — VALSARTAN 80 MG/1
240 TABLET ORAL
Qty: 90 TABLET | Refills: 0 | Status: SHIPPED | OUTPATIENT
Start: 2024-09-26

## 2024-09-25 RX ORDER — SPIRONOLACTONE 25 MG/1
25 TABLET ORAL DAILY
Qty: 30 TABLET | Refills: 0 | Status: SHIPPED | OUTPATIENT
Start: 2024-09-25

## 2024-09-25 RX ORDER — NICOTINE 21 MG/24HR
1 PATCH, TRANSDERMAL 24 HOURS TRANSDERMAL
Qty: 30 EACH | Refills: 0 | Status: SHIPPED | OUTPATIENT
Start: 2024-09-26

## 2024-09-25 RX ADMIN — Medication 10 ML: at 08:13

## 2024-09-25 RX ADMIN — SPIRONOLACTONE 25 MG: 25 TABLET, FILM COATED ORAL at 11:45

## 2024-09-25 RX ADMIN — CARVEDILOL 12.5 MG: 12.5 TABLET, FILM COATED ORAL at 08:13

## 2024-09-25 RX ADMIN — NICOTINE 1 PATCH: 14 PATCH TRANSDERMAL at 08:17

## 2024-09-25 RX ADMIN — VALSARTAN 240 MG: 160 TABLET, FILM COATED ORAL at 08:13

## 2024-09-25 NOTE — CONSULTS
Date of Hospital Visit: 2024  Date of consult: 2024  Encounter Provider: Yan Vazquez MD  Place of Service: [facility]  Patient Name: Vadim Elder  :1970  Referral Provider: No ref. provider found    Chief complaint: Lightheadedness    Reason for consult: Abnormal EKG/elevated troponin  I have used a  for history  History of Present Illness  Mr. Elder is a pleasant 54 years old gentleman who was admitted on 2024 with diagnosis of JEFFREY after he presented with symptoms of lightheadedness.  He was at work when he felt lightheaded and EMS found out he was hypotensive.  Initial ER workup revealed hyponatremia/hypokalemia, JEFFREY.  He was treated with IV fluids and nephrology followed patient.  Past medical history significant for hypertension on carvedilol, chlorthalidone, clonidine, lisinopril and on potassium supplement for chronic hypokalemia  Noted to have abnormal EKG with T wave inversion on lateral leads unchanged on 2024 and 2024.  Patient denies any chest pain.    History reviewed. No pertinent past medical history.    History reviewed. No pertinent surgical history.    Medications Prior to Admission   Medication Sig Dispense Refill Last Dose    carvedilol (COREG) 12.5 MG tablet Take 1 tablet by mouth 2 (Two) Times a Day.       chlorthalidone (HYGROTON) 25 MG tablet Take 1 tablet by mouth Daily.       cloNIDine (CATAPRES) 0.1 MG tablet Take 1 tablet by mouth 2 (Two) Times a Day.       lisinopril (PRINIVIL,ZESTRIL) 20 MG tablet Take 1 tablet by mouth Daily.       potassium chloride ER (K-TAB) 20 MEQ tablet controlled-release ER tablet Take 1 tablet by mouth 2 (Two) Times a Day.          Current Meds  Scheduled Meds:carvedilol, 12.5 mg, Oral, BID  nicotine, 1 patch, Transdermal, Q24H  sodium chloride, 10 mL, Intravenous, Q12H  spironolactone, 25 mg, Oral, Daily  valsartan, 240 mg, Oral, Q24H      Continuous Infusions:  "  PRN Meds:.  acetaminophen **OR** acetaminophen **OR** acetaminophen    Calcium Replacement - Follow Nurse / BPA Driven Protocol    Magnesium Standard Dose Replacement - Follow Nurse / BPA Driven Protocol    nitroglycerin    ondansetron ODT **OR** ondansetron    Phosphorus Replacement - Follow Nurse / BPA Driven Protocol    Potassium Replacement - Follow Nurse / BPA Driven Protocol    Potassium Replacement - Follow Nurse / BPA Driven Protocol    sodium chloride    sodium chloride    Allergies as of 09/23/2024    (No Known Allergies)       Social History     Socioeconomic History    Marital status:    Tobacco Use    Smoking status: Every Day     Current packs/day: 0.50     Average packs/day: 0.5 packs/day for 52.7 years (26.4 ttl pk-yrs)     Types: Cigarettes     Start date: 1972    Smokeless tobacco: Never   Vaping Use    Vaping status: Never Used   Substance and Sexual Activity    Alcohol use: Yes     Alcohol/week: 12.0 standard drinks of alcohol     Types: 12 Cans of beer per week     Comment: 2 beer/day    Drug use: Never       History reviewed. No pertinent family history.    REVIEW OF SYSTEMS:   All systems reviewed and pertinent positives include in HPI otherwise negative review of systems.       Objective:   Temp:  [98.2 °F (36.8 °C)-98.6 °F (37 °C)] 98.2 °F (36.8 °C)  Heart Rate:  [71-83] 71  Resp:  [16-20] 20  BP: (153-196)/() 180/109  Body mass index is 31.74 kg/m².  Flowsheet Rows      Flowsheet Row First Filed Value   Admission Height 172.7 cm (68\") Documented at 09/23/2024 1300   Admission Weight 92.5 kg (204 lb) Documented at 09/23/2024 1259          Vitals:    09/25/24 0730   BP: (!) 180/109   Pulse: 71   Resp: 20   Temp:    SpO2: 98%       General Appearance:    Alert, cooperative, in no acute distress   Head:    Normocephalic, without obvious abnormality, atraumatic   Eyes:            Lids and lashes normal, conjunctivae and sclerae normal, no   icterus, no pallor, corneas clear, " PERRLA   Ears:    Ears appear intact with no abnormalities noted   Throat:   No oral lesions, no thrush, oral mucosa moist   Neck:   No adenopathy, supple, trachea midline, no thyromegaly, no   carotid bruit, no JVD   Back:     No kyphosis present, no scoliosis present, no skin lesions, erythema or scars, no tenderness to percussion or palpation, range of motion normal   Lungs:     Clear to auscultation,respirations regular, even and unlabored    Heart:    Regular rhythm and normal rate, normal S1 and S2, no murmur, no gallop, no rub, no click   Chest Wall:    No abnormalities observed   Abdomen:     Normal bowel sounds, no masses, no organomegaly, soft nontender, nondistended, no guarding, no rebound  tenderness   Extremities:   Moves all extremities well, no edema, no cyanosis, no redness   Pulses:   Pulses palpable and equal bilaterally. Normal radial, carotid, femoral, dorsalis pedis and posterior tibial pulses bilaterally. Normal abdominal aorta   Skin:  Neurology:   Psychiatric:   No bleeding, bruising or rash   Normal speech and cranial nerve exam, no focal deficit   Alert and oriented x 3, normal mood and affect             Review of Data:      Results from last 7 days   Lab Units 09/25/24  0505 09/23/24  1554 09/23/24  1111   SODIUM mmol/L 130*   < > 119*   POTASSIUM mmol/L 3.7   < > 2.7*   CHLORIDE mmol/L 97*   < > 82*   CO2 mmol/L 23.0   < > 27.3   BUN mg/dL 10   < > 14   CREATININE mg/dL 0.84   < > 1.61*   CALCIUM mg/dL 9.0   < > 8.6   BILIRUBIN mg/dL  --   --  0.6   ALK PHOS U/L  --   --  53   ALT (SGPT) U/L  --   --  20   AST (SGOT) U/L  --   --  27   GLUCOSE mg/dL 98   < > 117*    < > = values in this interval not displayed.     Results from last 7 days   Lab Units 09/23/24  1111   HSTROP T ng/L 23*     @LABRCNTbnp@  Results from last 7 days   Lab Units 09/25/24  0505 09/23/24  1111   WBC 10*3/mm3 9.75 9.94   HEMOGLOBIN g/dL 15.1 15.2   HEMATOCRIT % 43.3 43.8   PLATELETS 10*3/mm3 215 226          Results from last 7 days   Lab Units 09/23/24  1111   MAGNESIUM mg/dL 2.2     @LABRCNTIP(chol,trig,hdl,ldl)    I personally viewed and interpreted the patient's EKG/Telemetry data  )   Hyponatremia    Acute kidney injury    Hypokalemia    Elevated troponin    Obesity (BMI 30-39.9)    Essential hypertension    Nephrolithiasis    Dizziness    Dehydration    Abnormal EKG        Assessment and Plan:  Patient admitted with hypotension and JEFFREY-resolved  Abnormal EKG with T wave inversion noted in lateral leads-unchanged on 2 EKGs done day apart.  He denies any rest or exertional chest pain.  No breathing problems.  No prior known cardiovascular illness other than hypertension for which he takes several medications.  Telemetry reviewed without significant abnormalities.  Borderline elevated flat troponin  Daily tobacco and occasional alcohol use  No family history of known cardiovascular illness    Echocardiogram  Check lipid panel, A1c  Further recommendation and May adjust BP meds depending on echocardiogram finding.      Addendum: Echocardiogram with moderate LVH, grade 1 diastolic dysfunction otherwise normal ventricular ejection fraction.  Need to take blood pressure medications consistently.  Recommend outpatient sleep study.  I will see him in office in 3 months.    Okay to DC patient home today.    I have discussed patient with Dr Bianka Vazquez MD  09/25/24  11:36 EDT.      Time spent in reviewing chart, discussion and examination:

## 2024-09-25 NOTE — DISCHARGE INSTR - LAB
Please take blood pressure medication consistently.   Please check blood pressure twice daily at home.

## 2024-09-25 NOTE — SIGNIFICANT NOTE
09/25/24 0641   OTHER   Discipline physical therapist   Therapy Assessment/Plan (PT)   Criteria for Skilled Interventions Met (PT) no problems identified which require skilled intervention  (53 yo male admitted from work with lightheadedness related to hypotension.  Pt indep PTA, no acute neuro/musculoskeletal changes indicating need for acute PT.  Pt with [facility] of 23.  Will s/o.)

## 2024-09-25 NOTE — PLAN OF CARE
Problem: Adult Inpatient Plan of Care  Goal: Plan of Care Review  Outcome: Progressing  Flowsheets (Taken 9/25/2024 1315)  Progress: improving  Plan of Care Reviewed With: patient  Outcome Evaluation: Patient is alert and oriented x4, SR with 1st degree AV block on telemetry, room air, and up with standby assist to the bathroom. Patient has no complaints of pain or discomfort- is eager to go home. Plan for patient to have Echo and cardiology consult- if all comes back clear patient can be discharged. Bed alarm on, bed in lowest position, and call light within reach.  Goal: Patient-Specific Goal (Individualized)  Outcome: Progressing  Goal: Absence of Hospital-Acquired Illness or Injury  Outcome: Progressing  Intervention: Identify and Manage Fall Risk  Recent Flowsheet Documentation  Taken 9/25/2024 1200 by Carolynn Cabrera RN  Safety Promotion/Fall Prevention:   activity supervised   assistive device/personal items within reach   clutter free environment maintained   fall prevention program maintained   lighting adjusted   nonskid shoes/slippers when out of bed   room organization consistent   safety round/check completed   toileting scheduled  Taken 9/25/2024 1030 by Carolynn Cabrera RN  Safety Promotion/Fall Prevention:   activity supervised   assistive device/personal items within reach   clutter free environment maintained   fall prevention program maintained   lighting adjusted   nonskid shoes/slippers when out of bed   room organization consistent   safety round/check completed   toileting scheduled  Taken 9/25/2024 0810 by Carolynn Cabrera RN  Safety Promotion/Fall Prevention:   activity supervised   assistive device/personal items within reach   clutter free environment maintained   fall prevention program maintained   lighting adjusted   nonskid shoes/slippers when out of bed   room organization consistent   safety round/check completed   toileting scheduled  Intervention: Prevent Skin Injury  Recent Flowsheet  Documentation  Taken 9/25/2024 1200 by Carolynn Cabrera RN  Body Position: (sitting up in bed)   position changed independently   weight shifting  Taken 9/25/2024 1030 by Carolynn Cabrera RN  Body Position:   position changed independently   weight shifting  Taken 9/25/2024 0810 by Carolynn Cabrera RN  Body Position:   position changed independently   weight shifting   supine  Skin Protection:   adhesive use limited   incontinence pads utilized   tubing/devices free from skin contact  Intervention: Prevent and Manage VTE (Venous Thromboembolism) Risk  Recent Flowsheet Documentation  Taken 9/25/2024 1200 by Carolynn Cabrera RN  Activity Management: sitting, edge of bed  Taken 9/25/2024 0810 by Carolynn Cabrera RN  Activity Management: activity encouraged  VTE Prevention/Management:   bilateral   sequential compression devices off   patient refused intervention  Range of Motion: active ROM (range of motion) encouraged  Intervention: Prevent Infection  Recent Flowsheet Documentation  Taken 9/25/2024 1200 by Carolynn Cabrera RN  Infection Prevention:   environmental surveillance performed   equipment surfaces disinfected   hand hygiene promoted   personal protective equipment utilized   rest/sleep promoted   single patient room provided  Taken 9/25/2024 1030 by Carolynn Cabrera RN  Infection Prevention:   environmental surveillance performed   equipment surfaces disinfected   hand hygiene promoted   personal protective equipment utilized   rest/sleep promoted   single patient room provided  Taken 9/25/2024 0810 by Carolynn Cabrera RN  Infection Prevention:   environmental surveillance performed   equipment surfaces disinfected   hand hygiene promoted   personal protective equipment utilized   rest/sleep promoted   single patient room provided  Goal: Optimal Comfort and Wellbeing  Outcome: Progressing  Intervention: Provide Person-Centered Care  Recent Flowsheet Documentation  Taken 9/25/2024 0810 by Carolynn Cabrera RN  [facility]  Relationship/Rapport:   care explained   choices provided   emotional support provided   empathic listening provided   questions answered   questions encouraged   reassurance provided   thoughts/feelings acknowledged  Goal: Readiness for Transition of Care  Outcome: Progressing     Problem: Hypertension Comorbidity  Goal: Blood Pressure in Desired Range  Outcome: Progressing  Intervention: Maintain Blood Pressure Management  Recent Flowsheet Documentation  Taken 9/25/2024 0810 by Carolynn Cabrera RN  Medication Review/Management: medications reviewed   Goal Outcome Evaluation:  Plan of Care Reviewed With: patient        Progress: improving  Outcome Evaluation: Patient is alert and oriented x4, SR with 1st degree AV block on telemetry, room air, and up with standby assist to the bathroom. Patient has no complaints of pain or discomfort- is eager to go home. Plan for patient to have Echo and cardiology consult- if all comes back clear patient can be discharged. Bed alarm on, bed in lowest position, and call light within reach.

## 2024-09-25 NOTE — SIGNIFICANT NOTE
09/25/24 1232   OTHER   Discipline occupational therapist   Therapy Assessment/Plan (PT)   Criteria for Skilled Interventions Met (PT) no problems identified which require skilled intervention  (Per RN, pt. up in room, no needs at this time, OT to sign off, reconsult PRN)

## 2024-09-25 NOTE — NURSING NOTE
Spoke with Dr. Vazquez after patient had Echo performed. Dr. Vazquez states that patient is okay to discharge- will see in his office in 1 month and he would like patient to take blood pressure medication consistently and check blood pressure at home.     Have informed Dr. Carlton.

## 2024-09-25 NOTE — PLAN OF CARE
Problem: Adult Inpatient Plan of Care  Goal: Absence of Hospital-Acquired Illness or Injury  Outcome: Progressing  Intervention: Identify and Manage Fall Risk  Recent Flowsheet Documentation  Taken 9/25/2024 0404 by Norberto Briceno RN  Safety Promotion/Fall Prevention:   safety round/check completed   room organization consistent   nonskid shoes/slippers when out of bed   lighting adjusted   clutter free environment maintained   assistive device/personal items within reach  Taken 9/25/2024 0200 by Norberto Briceno RN  Safety Promotion/Fall Prevention:   safety round/check completed   room organization consistent   nonskid shoes/slippers when out of bed   lighting adjusted   clutter free environment maintained   assistive device/personal items within reach  Taken 9/25/2024 0000 by Norberto Briceno RN  Safety Promotion/Fall Prevention:   safety round/check completed   room organization consistent   nonskid shoes/slippers when out of bed   lighting adjusted   clutter free environment maintained   assistive device/personal items within reach  Taken 9/24/2024 2200 by Norberto Briceno RN  Safety Promotion/Fall Prevention:   safety round/check completed   room organization consistent   nonskid shoes/slippers when out of bed   lighting adjusted   clutter free environment maintained   assistive device/personal items within reach  Taken 9/24/2024 2005 by Norberto Briceno RN  Safety Promotion/Fall Prevention:   safety round/check completed   room organization consistent   nonskid shoes/slippers when out of bed   lighting adjusted   clutter free environment maintained   assistive device/personal items within reach  Intervention: Prevent Skin Injury  Recent Flowsheet Documentation  Taken 9/25/2024 0404 by Norberto Briceno RN  Body Position: position changed independently  Taken 9/25/2024 0200 by Norberto Briceno RN  Body Position: position changed independently  Taken  9/25/2024 0000 by Norberto Briceno RN  Body Position: position changed independently  Taken 9/24/2024 2200 by Norberto Briceno RN  Body Position: position changed independently  Taken 9/24/2024 2005 by Norberto Briceno RN  Body Position: position changed independently  Skin Protection:   adhesive use limited   tubing/devices free from skin contact   transparent dressing maintained  Intervention: Prevent and Manage VTE (Venous Thromboembolism) Risk  Recent Flowsheet Documentation  Taken 9/25/2024 0404 by Norberto Briceno RN  Activity Management: activity minimized  Taken 9/25/2024 0200 by Norberto Briceno RN  Activity Management: activity minimized  Taken 9/25/2024 0000 by Norberto Briceno RN  Activity Management: activity minimized  Range of Motion: active ROM (range of motion) encouraged  Taken 9/24/2024 2200 by Norberto Briceno RN  Activity Management: activity minimized  Taken 9/24/2024 2005 by Norberto Briceno RN  Activity Management: activity encouraged  Range of Motion: active ROM (range of motion) encouraged   Goal Outcome Evaluation:  Plan of Care Reviewed With: patient           Outcome Evaluation: AOx4, VSS, SR on telemetry monitor, room air, standby assist to the bathroom, no c/o pain and discomfort, bed alarm on, call light within reach, will continue to monitor pt.

## 2024-09-25 NOTE — PROGRESS NOTES
[facility] Progress Note      Patient Name: Vadim Elder  : 1970  MRN: [id number]  Primary Care Physician:  Shiva Gutierrez MD  Date of admission: 2024    Subjective     Interval History:     Overnight no event  Patient lying in bed comfortable offers no complaints  Patient feels a little bit anxious after not smoking since his hospital admission  Denies any chest pain or palpitations  Tolerating oral intake  No nausea or emesis    Urinary spontaneously    Review of Systems:   As noted above    Objective     Vitals:   Temp:  [98.2 °F (36.8 °C)-98.6 °F (37 °C)] 98.2 °F (36.8 °C)  Heart Rate:  [71-83] 71  Resp:  [16-20] 20  BP: (153-196)/() 180/109    Intake/Output Summary (Last 24 hours) at 2024 0829  Last data filed at 2024 1800  Gross per 24 hour   Intake 840 ml   Output 1100 ml   Net -260 ml       Physical Exam:    General Appearance: alert, oriented x 3, no acute distress   Skin: warm and dry  HEENT: oral mucosa normal, nonicteric sclera  Neck: supple, no JVD  Lungs: CTA  Heart: RRR, normal S1 and S2  Abdomen: soft, nontender, nondistended  : no palpable bladder  Extremities: no edema, cyanosis or clubbing  Neuro: normal speech and mental status     Scheduled Meds:     carvedilol, 12.5 mg, Oral, BID  nicotine, 1 patch, Transdermal, Q24H  sodium chloride, 10 mL, Intravenous, Q12H  terazosin, 1 mg, Oral, Nightly  valsartan, 240 mg, Oral, Q24H      IV Meds:          Results Reviewed:   I have personally reviewed the results from the time of this admission to 2024 08:29 EDT     Results from last 7 days   Lab Units 24  0505 24  1252 24  1148 24  0148 24  1554 24  1111   SODIUM mmol/L 130* 127*  --  124*   < > 119*   POTASSIUM mmol/L 3.7 4.1 4.0 3.4*   < > 2.7*   CHLORIDE mmol/L 97* 95*  --  90*   < > 82*   CO2 mmol/L 23.0 24.9  --  24.4   < > 27.3   BUN mg/dL 10 10  --  12   < > 14   CREATININE mg/dL 0.84 0.91  --   0.97   < > 1.61*   CALCIUM mg/dL 9.0 9.2  --  8.6   < > 8.6   BILIRUBIN mg/dL  --   --   --   --   --  0.6   ALK PHOS U/L  --   --   --   --   --  53   ALT (SGPT) U/L  --   --   --   --   --  20   AST (SGOT) U/L  --   --   --   --   --  27   GLUCOSE mg/dL 98 82  --  94   < > 117*    < > = values in this interval not displayed.       Estimated Creatinine Clearance: 109.6 mL/min (by C-G formula based on SCr of 0.84 mg/dL).    Results from last 7 days   Lab Units 09/24/24  0148 09/23/24  1554 09/23/24  1111   MAGNESIUM mg/dL  --   --  2.2   PHOSPHORUS mg/dL 3.1 3.4 3.3       Results from last 7 days   Lab Units 09/23/24  1111   URIC ACID mg/dL 4.6       Results from last 7 days   Lab Units 09/25/24  0505 09/23/24  1111   WBC 10*3/mm3 9.75 9.94   HEMOGLOBIN g/dL 15.1 15.2   PLATELETS 10*3/mm3 215 226             Assessment / Plan     ASSESSMENT:    -Acute hypovolemic hyponatremia likely secondary to chlorthalidone accompanied with increased GI losses and increased free water intake , initial urine sodium of 22 after receiving 1 L of NS.  Her blood pressure has dramatically improved waiting for repeat renal panel, urine osmolarity, and serum osmolarity.  Likely will continue gentle hydration,  -Acute kidney injury likely secondary to prerenal state from increased GI losses accompanied with chlorthalidone and lisinopril affecting renal autoregulation associated with chronic use of NSAIDs currently managed with IV fluids.   -Chronic hypokalemia.  Has been between 3.1 and 3.3 for the last 4 years the patient has been on chlorthalidone that could be a contributing factor.  But concerning for hyperaldosteronism state.  Patient could benefit from K sparing agent during admission .  Continue oral KCl replacement magnesium levels stable at 2.2,  -Diarrhea with increased GI losses on replacement  -Hypertension initially hypotensive in the ER holding antihypertensive medications will resume gradually  -Tobacco abuse counseling  provided in [place]       PLAN:  Patient valsartan was increased from 160 to  320 mg and started  on spironolactone for his hypokalemia   His renal function and  electrolytes has dramatically improved  Patient can safely be discharged to continue patient care I will see the patient in a week in renal clinic to further adjust his current antihypertensive regimen.  Patient will be contacted by phone for his appointment    Thank you for involving us in the care of Vadim Elder.  Please feel free to call with any questions.    Encounter held in [place] patient verbalized understanding    Pro Jordan MD  09/25/24  08:29 EDT    [facility]  [phone]    Please note that portions of this note were completed with a voice recognition program.

## 2024-09-25 NOTE — DISCHARGE SUMMARY
Patient Name: Vadim Elder  : 1970  MRN: [id number]    Date of Admission: 2024  Date of Discharge:  2024  Primary Care Physician: Shiva Gutierrez MD      Chief Complaint:   Syncope      Discharge Diagnoses     Active Hospital Problems    Diagnosis  POA    **Hyponatremia [E87.1]  Yes    Abnormal EKG [R94.31]  Yes    Acute kidney injury [N17.9]  Yes    Hypokalemia [E87.6]  Yes    Elevated troponin [R79.89]  Yes    Obesity (BMI 30-39.9) [E66.9]  Yes    Essential hypertension [I10]  Yes    Nephrolithiasis [N20.0]  Yes    Dizziness [R42]  Yes    Dehydration [E86.0]  Yes      Resolved Hospital Problems   No resolved problems to display.        Hospital Course     Mr. Elder is a 54 y.o. male who presented to [facility] initially complaining of lightheadedness.  Please see the admitting history and physical for further details.  He was admitted to the hospital for further evaluation and treatment.      Hyponatremia  - Likely hypovolemic, urine studies, TSH, uric acid reviewed. Patient received IV fluids, values remain low on most recent labs, but were improving. Nephrology cleared patient for discharge since values improving and as discussed elsewhere, they are going to arrange for close outpatient follow up for repeat labs to guide ongoing management decisions.     Acute kidney injury  Dehydration  - Likely secondary to volume depletion. He received IV fluids, values subsequently improved on repeat testing.  Nephrology reassessed and cleared patient for discharge.  Plans for close outpatient follow-up with nephrology and repeat labs as discussed elsewhere.  Additionally, Recommend close follow up with PCP within 1 week after discharge for reassessment and repeat BMP to guide ongoing management decisions.    Hypertension  - Blood pressure elevated during hospital stay, he required adjustments to treatment regimen.  Home lisinopril, HCTZ and clonidine were stopped by nephrology.  He  was instead started on valsartan and spironolactone.  Nephrology reviewed blood pressures and assessed patient prior to discharge, they recommended continuation of this regimen as prescribed.  They are going to arrange for close outpatient follow-up for reassessment.  Additionally, Recommend close follow up with PCP within 1 week after discharge for reassessment to guide ongoing management decisions. Recommend that patient check blood pressure 2-3 times daily and record those values in log book and take it to next PCP visit to allow for greater insight into treatment efficacy to guide further management decisions. Recommend heart healthy/low sodium diet.    Elevated troponin  Abnormal EKG  Abnormal findings on telemetry  - Sinus pauses noted on telemetry, occurring while sleeping, EKG noting prolonged DC--elevated troponin, abnormal EKG, consulted cardiology, they evaluated, ordered 2D echocardiogram and reviewed, and then later cleared patient for discharge, stating no further acute intervention warranted and they are planning for outpatient follow up. We also discussed ithe sinus pauses occurring with sleep as above, which subsequently improved upon patient waking, concerning for sleep apnea and given risk factors, recommended outpatient sleep medicine evaluation, so this referral was placed at time of discharge. Recommend attention to this by PCP to ensure outpatient sleep medicine referral is completed. Follow up with Cardiology after discharge as scheduled.    Lightheadedness  - Likely due to volume depletion, CT head negative. Symptoms improved by time of discharge.     Hypokalemia  - K low on prior labs, repleted per electrolyte protocol improved on subsequent testing.  Nephrology recommended holding patient's previously scheduled home potassium at discharge, given that he was started on ARB and spironolactone, which increased risk of hyperkalemia.  Nephrology going to arrange for close outpatient follow-up  and repeat labs within 1 week after discharge for reassessment.     Nephrolithiasis  - Noted on imaging, no abdominal pain or any evidence of urinary retention.     Obesity  - BMI 31.63 kg/m2. Complicating all medical problems.    Day of Discharge     Subjective:  Patient seen and examined this morning.  Hospital day 2.  He is awake and alert, resting in bed, doing okay at present.  States that he feels better overall, he has been evaluated and cleared for discharge by consultants.    Physical Exam:  Temp:  [98.2 °F (36.8 °C)-98.6 °F (37 °C)] 98.2 °F (36.8 °C)  Heart Rate:  [71-83] 71  Resp:  [16-20] 20  BP: (153-196)/() 180/109  Body mass index is 31.74 kg/m².  Physical Exam  Vitals and nursing note reviewed.   Constitutional:       General: He is awake. He is not in acute distress.  Cardiovascular:      Rate and Rhythm: Normal rate and regular rhythm.      Pulses: Normal pulses.      Heart sounds: Normal heart sounds.   Pulmonary:      Effort: Pulmonary effort is normal. No respiratory distress.      Breath sounds: Normal breath sounds. No wheezing.   Abdominal:      Palpations: Abdomen is soft.      Tenderness: There is no abdominal tenderness.   Musculoskeletal:      Right lower leg: No edema.      Left lower leg: No edema.   Skin:     General: Skin is warm and dry.   Neurological:      Mental Status: He is alert.   Psychiatric:         Behavior: Behavior is cooperative.         Consultants     Consult Orders (all) (From admission, onward)       Start     Ordered    09/25/24 1116  Inpatient Cardiology Consult  Once        Comments: Spoke to Dr. Vazquez, he is going to come see patient   Specialty:  Cardiology  Provider:  Yan Vazquez MD    09/25/24 1115    09/23/24 1350  [facility] (on-call MD unless specified) Details  Once        Specialty:  Hospitalist  Provider:  Ralph Dela Cruz MD    09/23/24 1349    09/23/24 1350  Inpatient Nephrology Consult  Once        Specialty:  Nephrology  Provider:   Gaby Stark MD    09/23/24 1349    09/23/24 1337  Nephrology (on -call MD unless specified)  Once        Specialty:  Nephrology  Provider:  Pro Jordan MD    09/23/24 1336                  Procedures     * Surgery not found *    Imaging Results (All)       Procedure Component Value Units Date/Time    CT Abdomen Pelvis With Contrast [[id number]] Collected: 09/23/24 1309     Updated: 09/23/24 1322    Narrative:      CT ABDOMEN PELVIS W CONTRAST-     INDICATION: Abdominal pain, diarrhea     COMPARISON: None     TECHNIQUE:  Routine CT abdomen and pelvis with IV contrast. Coronal and sagittal  reformats. Radiation dose reduction techniques were utilized, including  automated exposure control and exposure modulation based on body size.     FINDINGS:      Lung bases: Small amount of subsegmental atelectasis seen at the bases.  Coronary artery atherosclerotic calcifications.     ABDOMEN: Small cyst seen in segment 2/3 of the left hepatic lobe. Normal  gallbladder. No biliary ductal dilatation. Spleen is normal in size. No  pancreatic mass or pancreatic ductal dilatation seen. No adrenal  nodules. Fluid attenuating cyst seen in the inferior pole left kidney.  No solid-appearing renal mass or hydronephrosis. Symmetric perinephric  edema. Nonobstructing nephrolithiasis in the inferior pole right kidney,  series 3, axial mage 71, measures 1 mm. Nonobstructing nephrolithiasis  in the inferior pole left kidney, series 3, axial image 76, measures 3  mm.     Pelvis: Prostate is normal in size. Moderate bladder distention. No  bladder calculus.     Bowel: No obstruction. Gas and fluid seen in the ascending colon. Normal  appendix.     Abdominal wall: Rectus diastases.     Retroperitoneum: No lymphadenopathy.     Vasculature: No abdominal aortic aneurysm. Moderate aortoiliac  atherosclerotic calcification. Atherosclerotic calcifications seen in  the proximal SMA.     Osseous structures: Thoracic and lumbar  spondylosis/degenerative disc  disease with multilevel disc osteophyte complexes seen in the thoracic  and lumbar spine. Lower lumbar facet degenerative arthropathy.       Impression:         1. No acute findings identified in the abdomen or pelvis.  2. Moderate bladder distention. Clinical correlation.  3. Nonobstructing nephrolithiasis.     This report was finalized on 9/23/2024 1:19 PM by Dr. Juan Vasquez M.D on Workstation: [id number]       CT Head Without Contrast [[id number]] Collected: 09/23/24 1300     Updated: 09/23/24 1303    Narrative:      CT HEAD WO CONTRAST-     HISTORY:  syncope     COMPARISON: None     FINDINGS: The brain ventricles are symmetrical. There is no evidence of  hemorrhage, hydrocephalus or of abnormal extra-axial fluid. No focal  area of decreased attenuation to suggest acute infarction is identified.       Impression:      No acute process is identified. Further evaluation could be  performed with a MRI examination of the brain as indicated.                 Radiation dose reduction techniques were utilized, including automated  exposure modulation based on body size.     This report was finalized on 9/23/2024 1:00 PM by Dr. Fady Merchant M.D  on Workstation: [id number]                 Pertinent Labs     Results from last 7 days   Lab Units 09/25/24  0505 09/23/24  1111   WBC 10*3/mm3 9.75 9.94   HEMOGLOBIN g/dL 15.1 15.2   PLATELETS 10*3/mm3 215 226     Results from last 7 days   Lab Units 09/25/24  0505 09/24/24  1252 09/24/24  1148 09/24/24  0148 09/23/24  1554   SODIUM mmol/L 130* 127*  --  124* 122*   POTASSIUM mmol/L 3.7 4.1 4.0 3.4* 3.0*   CHLORIDE mmol/L 97* 95*  --  90* 85*   CO2 mmol/L 23.0 24.9  --  24.4 26.0   BUN mg/dL 10 10  --  12 14   CREATININE mg/dL 0.84 0.91  --  0.97 1.21   GLUCOSE mg/dL 98 82  --  94 105*   EGFR mL/min/1.73 102.3 98.9  --  91.6 70.3     Results from last 7 days   Lab Units 09/24/24  0148 09/23/24  1554 09/23/24  1111   ALBUMIN g/dL 3.6 3.9  "3.6   BILIRUBIN mg/dL  --   --  0.6   ALK PHOS U/L  --   --  53   AST (SGOT) U/L  --   --  27   ALT (SGPT) U/L  --   --  20     Results from last 7 days   Lab Units 09/25/24  0505 09/24/24  1252 09/24/24  0148 09/23/24  1554 09/23/24  1111   CALCIUM mg/dL 9.0 9.2 8.6 9.0 8.6   ALBUMIN g/dL  --   --  3.6 3.9 3.6   MAGNESIUM mg/dL  --   --   --   --  2.2   PHOSPHORUS mg/dL  --   --  3.1 3.4 3.3       Results from last 7 days   Lab Units 09/23/24  1111   HSTROP T ng/L 23*     Results from last 7 days   Lab Units 09/23/24  1440 09/23/24  1111   SODIUM UR mmol/L 22  --    CREATININE UR mg/dL 31.4  --    CHLORIDE UR mmol/L 24  --    OSMOLALITY UR mOsm/kg 143  --    PROTEIN TOTAL URINE mg/dL 5.8  --    URIC ACID mg/dL  --  4.6   PROT/CREAT RATIO UR mg/G Crea 184.7  --          Invalid input(s): \"LDLCALC\"          Test Results Pending at Discharge     Pending Results       Procedure [Order ID] Specimen - Date/Time    Adult Transthoracic Echo Complete W/ Cont if Necessary Per Protocol [[id number]]               Discharge Details        Discharge Medications        New Medications        Instructions Start Date   nicotine 14 MG/24HR patch  Commonly known as: NICODERM CQ   1 patch, Transdermal, Every 24 Hours Scheduled   Start Date: September 26, 2024     spironolactone 25 MG tablet  Commonly known as: ALDACTONE   25 mg, Oral, Daily      valsartan 80 MG tablet  Commonly known as: DIOVAN   240 mg, Oral, Every 24 Hours Scheduled   Start Date: September 26, 2024            Continue These Medications        Instructions Start Date   carvedilol 12.5 MG tablet  Commonly known as: COREG   12.5 mg, Oral, 2 Times Daily             Stop These Medications      chlorthalidone 25 MG tablet  Commonly known as: HYGROTON     cloNIDine 0.1 MG tablet  Commonly known as: CATAPRES     lisinopril 20 MG tablet  Commonly known as: PRINIVIL,ZESTRIL     potassium chloride ER 20 MEQ tablet controlled-release ER tablet  Commonly known as: K-TAB        "       No Known Allergies    Discharge Disposition:        Discharge Diet:  Diet Order   Procedures    Diet: Regular/House; Fluid Consistency: Thin (IDDSI 0)       Discharge Activity:   Activity Instructions       Gradually Increase Activity Until at Pre-Hospitalization Level              CODE STATUS:    Code Status and Medical Interventions: CPR (Attempt to Resuscitate); Full Support   Ordered at: 09/23/24 1809     Code Status (Patient has no pulse and is not breathing):    CPR (Attempt to Resuscitate)     Medical Interventions (Patient has pulse or is breathing):    Full Support       No future appointments.  Additional Instructions for the Follow-ups that You Need to Schedule       Ambulatory Referral to Sleep Medicine   As directed      Follow-up needed: Yes        Discharge Follow-up with PCP   As directed       Currently Documented PCP:    Shiva Gutierrez MD    PCP Phone Number:    [phone]     Follow Up Details: within 1 week for reassessment, repeat BMP, CBC, blood pressure check        Discharge Follow-up with Specialty: Nephrology, Cardiology   As directed      Specialty: Nephrology, Cardiology   Follow Up Details: within 1 week or as scheduled.               Follow-up Information       Shiva Gutierrez MD .    Specialty: Internal Medicine  Why: within 1 week for reassessment, repeat BMP, CBC, blood pressure check  Contact information:  [address] [address]  [phone]                             Additional Instructions for the Follow-ups that You Need to Schedule       Ambulatory Referral to Sleep Medicine   As directed      Follow-up needed: Yes        Discharge Follow-up with PCP   As directed       Currently Documented PCP:    Shiva Gutierrez MD    PCP Phone Number:    [phone]     Follow Up Details: within 1 week for reassessment, repeat BMP, CBC, blood pressure check        Discharge Follow-up with Specialty: Nephrology, Cardiology   As directed       Specialty: Nephrology, Cardiology   Follow Up Details: within 1 week or as scheduled.            Time Spent on Discharge:  Greater than 30 minutes      DO [facility]  09/25/24  11:24 EDT

## 2024-09-26 NOTE — CASE MANAGEMENT/SOCIAL WORK
Case Management Discharge Note      Final Note: Home with family         Selected Continued Care - Discharged on 9/25/2024 Admission date: 9/23/2024 - Discharge disposition: Home or Self Care      Destination    No services have been selected for the patient.                Durable Medical Equipment    No services have been selected for the patient.                Dialysis/Infusion    No services have been selected for the patient.                Home Medical Care    No services have been selected for the patient.                Therapy    No services have been selected for the patient.                Community Resources    No services have been selected for the patient.                Community & DME    No services have been selected for the patient.                    Transportation Services  Private: Car    Final Discharge Disposition Code: 01 - home or self-care

## 2024-09-29 NOTE — PAYOR COMM NOTE
"Vadim Elder (54 y.o. Male)        PLEASE SEE ATTACHED FOR RECONSIDERATION.      REF#[id number]     PLEASE CALL  OR  [phone]    THANK YOU    MIRI MARION LPN CCP   Date of Birth   1970    Social Security Number       Address   [address] [place] [address]    Home Phone   [phone]    MRN   [id number]       Nondenominational   None    Marital Status                               Admission Date   24    Admission Type   Emergency    Admitting Provider   Ralph Dela Cruz MD    Attending Provider       Department, Room/Bed   [address], [address]/1       Discharge Date   2024    Discharge Disposition   Home or Self Care    Discharge Destination                                 Attending Provider: (none)   Allergies: No Known Allergies    Isolation: None   Infection: None   Code Status: Prior    Ht: 172.7 cm (68\")   Wt: 94.3 kg (208 lb)    Admission Cmt: None   Principal Problem: Hyponatremia [E87.1]                   Active Insurance as of 2024       Primary Coverage       Payor Plan Insurance Group Employer/Plan Group    ANTHEM BLUE CROSS [facility] Work Study CROSS BLUE SHIELD [facility] [id number]       Payor Plan Address Payor Plan Phone Number Payor Plan Fax Number Effective Dates    [address] [phone]  2020 - None Entered    [place] [address]         Subscriber Name Subscriber Birth Date Member ID       VADIM ELDER 1970 [id number]                     Emergency Contacts        (Rel.) Home Phone Work Phone Mobile Phone    CYDNEYECHO (Spouse) -- -- [phone]              [place]: [facility] [id number]  Tax ID [id number]     History & Physical        Ralph Dela Cruz MD at [phone]          HISTORY AND PHYSICAL   [facility]        Patient Identification:  Name: Vadim Elder  Age: 54 y.o.  Sex: male  :  1970  MRN: [id number]                     Primary Care Physician: Shiva Gutierrez, " MD    Chief Complaint: Lightheaded    History of Present Illness:   Pleasant 54-year-old gentleman with history of hypertension for which she has been taking Hygroton apparently for a number of years now.  He works in air conditioning.  He was feeling lightheaded at work today and EMS was called.  He was hypotensive on presentation.  He does note that he has been having loose stools and he had increased his oral intake.  Workup revealed significant hyponatremia as well as hypokalemia.  Available records indicate that he has had chronic hypokalemia though not to this severity.  In addition there have been episodes of hyponatremia in the past.  Presently feeling better after IV fluids.        Past Medical History:  History reviewed. No pertinent past medical history.  Past Surgical History:  History reviewed. No pertinent surgical history.   Home Meds:  Medications Prior to Admission   Medication Sig Dispense Refill Last Dose    carvedilol (COREG) 12.5 MG tablet Take 1 tablet by mouth 2 (Two) Times a Day.       chlorthalidone (HYGROTON) 25 MG tablet Take 1 tablet by mouth Daily.       cloNIDine (CATAPRES) 0.1 MG tablet Take 1 tablet by mouth 2 (Two) Times a Day.       lisinopril (PRINIVIL,ZESTRIL) 20 MG tablet Take 1 tablet by mouth Daily.       potassium chloride ER (K-TAB) 20 MEQ tablet controlled-release ER tablet Take 1 tablet by mouth 2 (Two) Times a Day.          Allergies:  No Known Allergies  Immunizations:    There is no immunization history on file for this patient.  Social History:   Social History     Social History Narrative    Not on file     Social History     Tobacco Use    Smoking status: Every Day     Current packs/day: 0.50     Average packs/day: 0.5 packs/day for 52.7 years (26.4 ttl pk-yrs)     Types: Cigarettes     Start date: 1972    Smokeless tobacco: Never   Substance Use Topics    Alcohol use: Yes     Alcohol/week: 12.0 standard drinks of alcohol     Types: 12 Cans of beer per week      Comment: 2 beer/day     Family History:  History reviewed. No pertinent family history.     Review of Systems  Review of Systems   Constitutional:         As per history of present illness       Objective:  T Max 24 hrs: Temp (24hrs), Av.9 °F (36.6 °C), Min:97.3 °F (36.3 °C), Max:98.5 °F (36.9 °C)    Vitals Ranges:   Temp:  [97.3 °F (36.3 °C)-98.5 °F (36.9 °C)] 97.3 °F (36.3 °C)  Heart Rate:  [66-78] 74  Resp:  [16] 16  BP: (105-167)/(55-94) 167/85      Exam:  Physical Exam  Constitutional:       General: He is not in acute distress.     Appearance: Normal appearance. He is normal weight. He is not ill-appearing, toxic-appearing or diaphoretic.   HENT:      Head: Normocephalic and atraumatic.      Right Ear: External ear normal.      Left Ear: External ear normal.      Nose: Nose normal.      Mouth/Throat:      Mouth: Mucous membranes are moist.   Eyes:      General: No scleral icterus.        Right eye: No discharge.         Left eye: No discharge.      Extraocular Movements: Extraocular movements intact.      Conjunctiva/sclera: Conjunctivae normal.   Cardiovascular:      Rate and Rhythm: Normal rate and regular rhythm.      Heart sounds:      No friction rub. No gallop.   Pulmonary:      Effort: Pulmonary effort is normal.      Breath sounds: Normal breath sounds.   Abdominal:      General: Abdomen is flat. Bowel sounds are normal. There is no distension.      Palpations: Abdomen is soft. There is no mass.      Tenderness: There is no abdominal tenderness. There is no guarding or rebound.   Musculoskeletal:      Cervical back: Neck supple.      Right lower leg: No edema.      Left lower leg: No edema.   Skin:     General: Skin is warm and dry.   Neurological:      General: No focal deficit present.      Mental Status: He is alert and oriented to person, place, and time.   Psychiatric:         Mood and Affect: Mood normal.         Behavior: Behavior normal.         Thought Content: Thought content normal.          Judgment: Judgment normal.         Data Review:  All labs and radiology reviewed.    Assessment:  Hyponatremia: Associated with the use of thiazide diuretics as well as increased free fluid intake.  Nephrology input appreciated.  Continue hydration and hold Hygroton.  Recommend looking to change this to a different antihypertensive.  Hypokalemia: Continue replacement protocol.  Keep Hygroton on hold.  JEFFREY/dehydration: Already showing good response to IV hydration.  Hold Hygroton.  Continue hydration.  Hypertension: Resume home meds with exception of Hygroton.  Lightheadedness: Secondary to hypotension secondary to antihypertensives and dehydration.  Resolved.  Recent history of loose stools: Monitor.  If persist consider GI panel.      Plan:  Please see above.  Discussed with ER provider.  Considering the size of his bladder on CT scan will also check postvoid residual.    Ralph Dela Cruz MD  9/23/2024  17:57 EDT    EMR Dragon/Transcription disclaimer:   Much of this encounter note is an electronic transcription/translation of spoken language to printed text. The electronic translation of spoken language may permit erroneous, or at times, nonsensical words or phrases to be inadvertently transcribed; Although I have reviewed the note for such errors, some may still exist.       Electronically signed by Ralph Dela Cruz MD at 09/23/24 1806          Emergency Department Notes        Joycelyn West RN at 09/23/24 1443          ..Nursing report ED to floor  [facility]  54 y.o.  male    HPI :  HPI (Adult)  Stated Reason for Visit: syncopal episode at work, denies hitting head, no thinners, low BP upon ems arrival 80s systolic  History Obtained From: EMS    Chief Complaint  Chief Complaint   Patient presents with    Syncope       Admitting doctor:   Ralph Dela Cruz MD    Admitting diagnosis:   The primary encounter diagnosis was Near syncope. Diagnoses of Hyponatremia and Hypokalemia were also  pertinent to this visit.    Code status:   Current Code Status       Date Active Code Status Order ID Comments User Context       Not on file            Allergies:   Patient has no known allergies.    Isolation:   No active isolations    Intake and Output    Intake/Output Summary (Last 24 hours) at 9/23/2024 1443  Last data filed at 9/23/2024 1443  Gross per 24 hour   Intake 1700 ml   Output 400 ml   Net 1300 ml       Weight:       09/23/24  1259   Weight: 92.5 kg (204 lb)       Most recent vitals:   Vitals:    09/23/24 1308 09/23/24 1309 09/23/24 1331 09/23/24 1431   BP: 143/86  155/86 155/94   BP Location:       Pulse: 73 73 66 78   Resp:       Temp:       TempSrc:       SpO2: 99% 98% 97% 94%   Weight:       Height:           Active LDAs/IV Access:   Lines, Drains & Airways       Active LDAs       Name Placement date Placement time Site Days    Peripheral IV 09/23/24 1044 Right Antecubital 09/23/24  1044  Antecubital  less than 1                    Labs (abnormal labs have a star):   Labs Reviewed   COMPREHENSIVE METABOLIC PANEL - Abnormal; Notable for the following components:       Result Value    Glucose 117 (*)     Creatinine 1.61 (*)     Sodium 119 (*)     Potassium 2.7 (*)     Chloride 82 (*)     Total Protein 5.7 (*)     eGFR 49.9 (*)     All other components within normal limits    Narrative:     GFR Normal >60  Chronic Kidney Disease <60  Kidney Failure <15     SINGLE HS TROPONIN T - Abnormal; Notable for the following components:    HS Troponin T 23 (*)     All other components within normal limits    Narrative:     High Sensitive Troponin T Reference Range:  <14.0 ng/L- Negative Female for AMI  <22.0 ng/L- Negative Male for AMI  >=14 - Abnormal Female indicating possible myocardial injury.  >=22 - Abnormal Male indicating possible myocardial injury.   Clinicians would have to utilize clinical acumen, EKG, Troponin, and serial changes to determine if it is an Acute Myocardial Infarction or myocardial  injury due to an underlying chronic condition.        CBC WITH AUTO DIFFERENTIAL - Abnormal; Notable for the following components:    Neutrophil % 76.3 (*)     Lymphocyte % 12.2 (*)     Neutrophils, Absolute 7.59 (*)     Monocytes, Absolute 0.95 (*)     All other components within normal limits   GASTROINTESTINAL PANEL, PCR (PREFERRED) DOES NOT INCLUDE CDIFF - Normal    Narrative:     If Aeromonas, Staphylococcus aureus or Bacillus cereus are suspected, please order [id number]: Stool Culture, Aeromonas, S aureus, B Cereus.   MAGNESIUM - Normal   TSH RFX ON ABNORMAL TO FREE T4 - Normal   URIC ACID - Normal   PHOSPHORUS - Normal   CORTISOL    Narrative:     Cortisol Reference Ranges:    Cortisol 6AM - 10AM Range: 6.02-18.40 mcg/dl  Cortisol 4PM - 8PM Range: 2.68-10.50 mcg/dl      Results may be falsely increased if patient taking Biotin.     RENAL FUNCTION PANEL   OSMOLALITY, SERUM   SODIUM, URINE, RANDOM   POTASSIUM, URINE, RANDOM   CHLORIDE, URINE, RANDOM   OSMOLALITY, URINE   CBC AND DIFFERENTIAL    Narrative:     The following orders were created for panel order CBC & Differential.  Procedure                               Abnormality         Status                     ---------                               -----------         ------                     CBC Auto Differential[[id number]]        Abnormal            Final result                 Please view results for these tests on the individual orders.       EKG:   ECG 12 Lead Syncope   Preliminary Result   HEART RATE=75  bpm   RR Klvfrnon=971  ms   NH Djeranee=519  ms   P Horizontal Axis=-19  deg   P Front Axis=45  deg   QRSD Kecbjgyp=845  ms   QT Qdzcdhyx=705  ms   UPwT=426  ms   QRS Axis=55  deg   T Wave Axis=167  deg   - ABNORMAL ECG -   Sinus rhythm   Prolonged NH interval   Probable left atrial enlargement   Repol abnrm suggests ischemia, lateral leads   Date and Time of Study:2024-09-23 12:04:03      Telemetry Scan   Final Result      Telemetry Scan   Final  Result          Meds given in ED:   Medications   Potassium Replacement - Follow Nurse / BPA Driven Protocol (has no administration in time range)   potassium chloride (K-DUR,KLOR-CON) ER tablet 40 mEq (40 mEq Oral Given 9/23/24 1304)   sodium chloride 0.9 % bolus 1,000 mL (0 mL Intravenous Stopped 9/23/24 1329)   iopamidol (ISOVUE-300) 61 % injection 100 mL (85 mL Intravenous Given by Other 9/23/24 1242)       Imaging results:  CT Abdomen Pelvis With Contrast    Result Date: 9/23/2024   1. No acute findings identified in the abdomen or pelvis. 2. Moderate bladder distention. Clinical correlation. 3. Nonobstructing nephrolithiasis.  This report was finalized on 9/23/2024 1:19 PM by Dr. Juan Vasquez M.D on Workstation: [id number]      CT Head Without Contrast    Result Date: 9/23/2024  No acute process is identified. Further evaluation could be performed with a MRI examination of the brain as indicated.      Radiation dose reduction techniques were utilized, including automated exposure modulation based on body size.  This report was finalized on 9/23/2024 1:00 PM by Dr. Fady Merchant M.D on Workstation: [id number]       Ambulatory status:   - up with assist x1    Social issues:   Social History     Socioeconomic History    Marital status:        Peripheral Neurovascular  Peripheral Neurovascular (Adult)  Peripheral Neurovascular WDL: WDL    Neuro Cognitive  Neuro Cognitive (Adult)  Cognitive/Neuro/Behavioral WDL: WDL  Orientation: oriented x 4  Speech: logical, clear  Pupils  Pupil PERRLA: yes    Learning  Learning Assessment (Adult)  Learning Readiness and Ability: language barrier identified  :  requested    Respiratory  Respiratory WDL  Respiratory WDL: WDL    Abdominal Pain       Pain Assessments  Pain (Adult)  (0-10) Pain Rating: Rest: 0  (0-10) Pain Rating: Activity: 0    NIH Stroke Scale       Joycelyn West RN  09/23/24 14:43 EDT      Electronically signed by Jenna  Joycelyn RN at 09/23/24 1444       Andrew Magdaleno MD at 09/23/24 1110           EMERGENCY DEPARTMENT ENCOUNTER    Room Number:  25/25  PCP: Provider, No Known  Historian: Patient      HPI:  Chief Complaint: Syncope  A complete HPI/ROS/PMH/PSH/SH/FH are unobtainable due to: Language barrier history obtained through   Context: Vadim Elder is a 54 y.o. male who presents to the ED c/o syncope.  Patient states he is never passed out before.  Patient was at work today and his vision got blurry.  Patient apparently went down.  Did not hit his head.  Patient's blood pressure initially was low better now.  Had no chest pain or shortness of breath.  No heart racing.  No abdominal pain.  No vomiting or diarrhea.  No fevers or chills.            PAST MEDICAL HISTORY  Active Ambulatory Problems     Diagnosis Date Noted    No Active Ambulatory Problems     Resolved Ambulatory Problems     Diagnosis Date Noted    No Resolved Ambulatory Problems     No Additional Past Medical History         PAST SURGICAL HISTORY  History reviewed. No pertinent surgical history.      FAMILY HISTORY  History reviewed. No pertinent family history.      SOCIAL HISTORY  Social History     Socioeconomic History    Marital status:    Substance and Sexual Activity    Alcohol use: Yes     Alcohol/week: 12.0 standard drinks of alcohol     Types: 12 Cans of beer per week     Comment: 2 beer/day         ALLERGIES  Patient has no known allergies.        REVIEW OF SYSTEMS  Review of Systems   Syncope      PHYSICAL EXAM  ED Triage Vitals   Temp Heart Rate Resp BP SpO2   09/23/24 1044 09/23/24 1044 09/23/24 1044 09/23/24 1044 09/23/24 1044   98.5 °F (36.9 °C) 71 16 105/55 95 %      Temp src Heart Rate Source Patient Position BP Location FiO2 (%)   09/23/24 1044 09/23/24 1044 -- 09/23/24 1105 --   Oral Monitor  Right arm        Physical Exam      GENERAL: no acute distress  HENT: nares patent  EYES: no scleral icterus  CV: regular rhythm,  normal rate  RESPIRATORY: normal effort  ABDOMEN: soft  MUSCULOSKELETAL: no deformity  NEURO: alert, moves all extremities, follows commands  PSYCH:  calm, cooperative  SKIN: warm, dry    Vital signs and nursing notes reviewed.          LAB RESULTS  Recent Results (from the past 24 hour(s))   Comprehensive Metabolic Panel    Collection Time: 09/23/24 11:11 AM    Specimen: Blood   Result Value Ref Range    Glucose 117 (H) 65 - 99 mg/dL    BUN 14 6 - 20 mg/dL    Creatinine 1.61 (H) 0.76 - 1.27 mg/dL    Sodium 119 (C) 136 - 145 mmol/L    Potassium 2.7 (L) 3.5 - 5.2 mmol/L    Chloride 82 (L) 98 - 107 mmol/L    CO2 27.3 22.0 - 29.0 mmol/L    Calcium 8.6 8.6 - 10.5 mg/dL    Total Protein 5.7 (L) 6.0 - 8.5 g/dL    Albumin 3.6 3.5 - 5.2 g/dL    ALT (SGPT) 20 1 - 41 U/L    AST (SGOT) 27 1 - 40 U/L    Alkaline Phosphatase 53 39 - 117 U/L    Total Bilirubin 0.6 0.0 - 1.2 mg/dL    Globulin 2.1 gm/dL    A/G Ratio 1.7 g/dL    BUN/Creatinine Ratio 8.7 7.0 - 25.0    Anion Gap 9.7 5.0 - 15.0 mmol/L    eGFR 49.9 (L) >60.0 mL/min/1.73   Single High Sensitivity Troponin T    Collection Time: 09/23/24 11:11 AM    Specimen: Blood   Result Value Ref Range    HS Troponin T 23 (H) <22 ng/L   CBC Auto Differential    Collection Time: 09/23/24 11:11 AM    Specimen: Blood   Result Value Ref Range    WBC 9.94 3.40 - 10.80 10*3/mm3    RBC 4.74 4.14 - 5.80 10*6/mm3    Hemoglobin 15.2 13.0 - 17.7 g/dL    Hematocrit 43.8 37.5 - 51.0 %    MCV 92.4 79.0 - 97.0 fL    MCH 32.1 26.6 - 33.0 pg    MCHC 34.7 31.5 - 35.7 g/dL    RDW 12.6 12.3 - 15.4 %    RDW-SD 43.0 37.0 - 54.0 fl    MPV 9.2 6.0 - 12.0 fL    Platelets 226 140 - 450 10*3/mm3    Neutrophil % 76.3 (H) 42.7 - 76.0 %    Lymphocyte % 12.2 (L) 19.6 - 45.3 %    Monocyte % 9.6 5.0 - 12.0 %    Eosinophil % 0.9 0.3 - 6.2 %    Basophil % 0.6 0.0 - 1.5 %    Immature Grans % 0.4 0.0 - 0.5 %    Neutrophils, Absolute 7.59 (H) 1.70 - 7.00 10*3/mm3    Lymphocytes, Absolute 1.21 0.70 - 3.10 10*3/mm3     Monocytes, Absolute 0.95 (H) 0.10 - 0.90 10*3/mm3    Eosinophils, Absolute 0.09 0.00 - 0.40 10*3/mm3    Basophils, Absolute 0.06 0.00 - 0.20 10*3/mm3    Immature Grans, Absolute 0.04 0.00 - 0.05 10*3/mm3    nRBC 0.0 0.0 - 0.2 /100 WBC   Magnesium    Collection Time: 09/23/24 11:11 AM    Specimen: Blood   Result Value Ref Range    Magnesium 2.2 1.6 - 2.6 mg/dL   TSH Rfx On Abnormal To Free T4    Collection Time: 09/23/24 11:11 AM    Specimen: Blood   Result Value Ref Range    TSH 2.370 0.270 - 4.200 uIU/mL   Uric Acid    Collection Time: 09/23/24 11:11 AM    Specimen: Blood   Result Value Ref Range    Uric Acid 4.6 3.4 - 7.0 mg/dL   Cortisol    Collection Time: 09/23/24 11:11 AM    Specimen: Blood   Result Value Ref Range    Cortisol 17.30   mcg/dL   Phosphorus    Collection Time: 09/23/24 11:11 AM    Specimen: Blood   Result Value Ref Range    Phosphorus 3.3 2.5 - 4.5 mg/dL   Gastrointestinal Panel, PCR - Stool, Per Rectum    Collection Time: 09/23/24 11:53 AM    Specimen: Per Rectum; Stool   Result Value Ref Range    Campylobacter Not Detected Not Detected    Plesiomonas shigelloides Not Detected Not Detected    Salmonella Not Detected Not Detected    Vibrio Not Detected Not Detected    Vibrio cholerae Not Detected Not Detected    Yersinia enterocolitica Not Detected Not Detected    Enteroaggregative E. coli (EAEC) Not Detected Not Detected    Enteropathogenic E. coli (EPEC) Not Detected Not Detected    Enterotoxigenic E. coli (ETEC) lt/st Not Detected Not Detected    Shiga-like toxin-producing E. coli (STEC) stx1/stx2 Not Detected Not Detected    Shigella/Enteroinvasive E. coli (EIEC) Not Detected Not Detected    Cryptosporidium Not Detected Not Detected    Cyclospora cayetanensis Not Detected Not Detected    Entamoeba histolytica Not Detected Not Detected    Giardia lamblia Not Detected Not Detected    Adenovirus F40/41 Not Detected Not Detected    Astrovirus Not Detected Not Detected    Norovirus GI/GII Not  Detected Not Detected    Rotavirus A Not Detected Not Detected    Sapovirus (I, II, IV or V) Not Detected Not Detected   ECG 12 Lead Syncope    Collection Time: 09/23/24 12:04 PM   Result Value Ref Range    QT Interval 434 ms    QTC Interval 485 ms       Ordered the above labs and reviewed the results.        RADIOLOGY  CT Abdomen Pelvis With Contrast    Result Date: 9/23/2024  CT ABDOMEN PELVIS W CONTRAST-  INDICATION: Abdominal pain, diarrhea  COMPARISON: None  TECHNIQUE: Routine CT abdomen and pelvis with IV contrast. Coronal and sagittal reformats. Radiation dose reduction techniques were utilized, including automated exposure control and exposure modulation based on body size.  FINDINGS:  Lung bases: Small amount of subsegmental atelectasis seen at the bases. Coronary artery atherosclerotic calcifications.  ABDOMEN: Small cyst seen in segment 2/3 of the left hepatic lobe. Normal gallbladder. No biliary ductal dilatation. Spleen is normal in size. No pancreatic mass or pancreatic ductal dilatation seen. No adrenal nodules. Fluid attenuating cyst seen in the inferior pole left kidney. No solid-appearing renal mass or hydronephrosis. Symmetric perinephric edema. Nonobstructing nephrolithiasis in the inferior pole right kidney, series 3, axial mage 71, measures 1 mm. Nonobstructing nephrolithiasis in the inferior pole left kidney, series 3, axial image 76, measures 3 mm.  Pelvis: Prostate is normal in size. Moderate bladder distention. No bladder calculus.  Bowel: No obstruction. Gas and fluid seen in the ascending colon. Normal appendix.  Abdominal wall: Rectus diastases.  Retroperitoneum: No lymphadenopathy.  Vasculature: No abdominal aortic aneurysm. Moderate aortoiliac atherosclerotic calcification. Atherosclerotic calcifications seen in the proximal SMA.  Osseous structures: Thoracic and lumbar spondylosis/degenerative disc disease with multilevel disc osteophyte complexes seen in the thoracic and lumbar  spine. Lower lumbar facet degenerative arthropathy.       1. No acute findings identified in the abdomen or pelvis. 2. Moderate bladder distention. Clinical correlation. 3. Nonobstructing nephrolithiasis.  This report was finalized on 9/23/2024 1:19 PM by Dr. Juan Vasquez M.D on Workstation: [id number]      CT Head Without Contrast    Result Date: 9/23/2024  CT HEAD WO CONTRAST-  HISTORY:  syncope  COMPARISON: None  FINDINGS: The brain ventricles are symmetrical. There is no evidence of hemorrhage, hydrocephalus or of abnormal extra-axial fluid. No focal area of decreased attenuation to suggest acute infarction is identified.      No acute process is identified. Further evaluation could be performed with a MRI examination of the brain as indicated.      Radiation dose reduction techniques were utilized, including automated exposure modulation based on body size.  This report was finalized on 9/23/2024 1:00 PM by Dr. Fady Merchant M.D on Workstation: [id number]       Ordered the above noted radiological studies.  CT abdomen independent interpreted by me and shows no evidence of obstruction          PROCEDURES  Procedures    EKG          EKG time: 1204  Rhythm/Rate: Normal sinus rhythm 75  P waves and VA: Normal P waves first-degree AV block  QRS, axis: Normal QRS  ST and T waves: Nonspecific ST-T wave    Interpreted Contemporaneously by me, independently viewed  No prior      MEDICATIONS GIVEN IN ER  Medications   Potassium Replacement - Follow Nurse / BPA Driven Protocol (has no administration in time range)   potassium chloride (K-DUR,KLOR-CON) ER tablet 40 mEq (40 mEq Oral Given 9/23/24 1304)   nicotine (NICODERM CQ) 14 MG/24HR patch 1 patch (has no administration in time range)   sodium chloride 0.9 % bolus 1,000 mL (0 mL Intravenous Stopped 9/23/24 1329)   iopamidol (ISOVUE-300) 61 % injection 100 mL (85 mL Intravenous Given by Other 9/23/24 1242)                   MEDICAL DECISION MAKING, PROGRESS, and  CONSULTS    All labs have been independently reviewed by me.  All radiology studies have been reviewed by me and I have also reviewed the radiology report.   EKG's independently viewed and interpreted by me.  Discussion below represents my analysis of pertinent findings related to patient's condition, differential diagnosis, treatment plan and final disposition.      Additional sources:  - Discussed/ obtained information from independent historians: None    - External (non-ED) record review: Epic reviewed and patient seen primary provider's office 9/10/2024 for hypertension    - Chronic or social conditions impacting care: None    - Shared decision making: None      Orders placed during this visit:  Orders Placed This Encounter   Procedures    Gastrointestinal Panel, PCR - Stool, Per Rectum    CT Head Without Contrast    CT Abdomen Pelvis With Contrast    Comprehensive Metabolic Panel    Single High Sensitivity Troponin T    CBC Auto Differential    Magnesium    Potassium    Renal Function Panel    Osmolality, Serum    Sodium, Urine, Random - Urine, Clean Catch    Potassium, Urine, Random - Urine, Clean Catch    Chloride, Urine, Random - Urine, Clean Catch    Osmolality, Urine - Urine, Clean Catch    TSH Rfx On Abnormal To Free T4    Uric Acid    Cortisol    Phosphorus    Nephrology (on -call MD unless specified)    [facility] (on-call MD unless specified) Details    Inpatient Nephrology Consult    ECG 12 Lead Syncope    Telemetry Scan    Telemetry Scan    Inpatient Admission    CBC & Differential         Additional orders considered but not ordered:  None        Differential diagnosis includes but is not limited to:    Hyponatremia hypokalemia dehydration      Independent interpretation of labs, radiology studies, and discussions with consultants:  ED Course as of 09/23/24 1450   Mon Sep 23, 2024   1414 14:14 EDT  Patient with near syncopal episode at work.  Has had multiple episodes of diarrhea here.  CT scan shows no  evidence of colitis.  Patient is on diuretic.  Patient was found to be hyponatremic.  Was given 1 L of normal saline.  Patient has been discussed with nephrology we will see patient in the hospital.  They have ordered several tests.  Patient discussed with Dr. Dela Cruz who will admit. [SL]   1449 14:49 EDT  Patient wanting to go home.  I again convinced him to stay in the hospital.  Patient has been seen by nephrology.  Will be admitted.  Will give him nicotine patch. [SL]      ED Course User Index  [SL] Andrew Magdaleno MD                 DIAGNOSIS  Final diagnoses:   Near syncope   Hyponatremia   Hypokalemia         DISPOSITION  admit            Latest Documented Vital Signs:  As of 14:50 EDT  BP- 155/94 HR- 78 Temp- 98.5 °F (36.9 °C) (Oral) O2 sat- 94%              --    Please note that portions of this were completed with a voice recognition program.       Note Disclaimer: At [facility], we believe that sharing information builds trust and better relationships. You are receiving this note because you are receiving care at [facility] or recently visited. It is possible you will see health information before a provider has talked with you about it. This kind of information can be easy to misunderstand. To help you fully understand what it means for your health, we urge you to discuss this note with your provider.            Andrew Magdaleno MD  09/23/24 1451      Electronically signed by Andrew Magdaleno MD at 09/23/24 1451       Dimitri Tate, RN at 09/23/24 1042          Syncopal episode at work    Electronically signed by Dimitri Tate RN at 09/23/24 1043       Oxygen Therapy (last 3 days) before discharge       Date/Time SpO2 Device (Oxygen Therapy) Flow (L/min) Oxygen Concentration (%) ETCO2 (mmHg)    09/25/24 1428 100 room air -- -- --    09/25/24 0810 -- room air -- -- --    09/25/24 0730 98 room air -- -- --    09/25/24 0729 98 -- -- -- --    09/25/24 0000 -- room air -- -- --     09/24/24 2005 -- room air -- -- --    09/24/24 1922 96 -- -- -- --    09/24/24 1641 95 room air -- -- --    09/24/24 1637 98 room air -- -- --    09/24/24 1635 100 room air -- -- --    09/24/24 1338 99 -- -- -- --    09/24/24 1337 95 room air -- -- --    09/24/24 0735 100 room air -- -- --    09/24/24 0045 -- room air -- -- --    09/23/24 2315 96 -- -- -- --    09/23/24 2115 -- room air -- -- --    09/23/24 1946 98 -- -- -- --    09/23/24 1700 -- room air -- -- --    09/23/24 1645 98 room air -- -- --    09/23/24 1431 94 -- -- -- --    09/23/24 1331 97 -- -- -- --    09/23/24 1309 98 -- -- -- --    09/23/24 1308 99 -- -- -- --    09/23/24 1229 95 -- -- -- --    09/23/24 1211 97 -- -- -- --    09/23/24 1105 -- room air -- -- --    09/23/24 1044 95 -- -- -- --          Intake & Output (last 5 days)         09/24 0701 09/25 0700 09/25 0701 09/26 0700 09/26 0701 09/27 0700 09/27 0701 09/28 0700 09/28 0701 09/29 0700 09/29 0701 09/30 0700    P.O. 840         I.V. (mL/kg)          IV Piggyback          Total Intake(mL/kg) 840 (8.9)         Urine (mL/kg/hr) 1700 (0.7) 650 (0.3)        Total Output 1700 650        Net -860 -650                  Urine Unmeasured Occurrence 1 x               Lines, Drains & Airways       Active LDAs       None              Inactive LDAs       Name Placement date Placement time Removal date Removal time Site Days    [REMOVED] Peripheral IV 09/23/24 1044 Right Antecubital 09/23/24  1044  09/25/24  1600  Antecubital  2                  Medication Administration Report for Vadim Elder as of 9/19/24 through 9/25/24     Legend:    Given Hold Not Given Due Canceled Entry Other Actions    Time Time (Time) Time Time-Action         Discontinued     Completed     Future     MAR Hold     Linked             Medications 09/19/24 09/20/24 09/21/24 09/22/24 09/23/24 09/24/24 09/25/24     Completed Medications   iopamidol (ISOVUE-300) 61 % injection 100 mL  Dose: 100 mL  Freq: Once in Imaging  Route: IV  Start: 09/23/24 1258 End: 09/23/24 1242          1242-Given by Other            potassium chloride (K-DUR,KLOR-CON) ER tablet 40 mEq  Dose: 40 mEq  Freq: Every 2 Hours Route: PO  Start: 09/24/24 0745 End: 09/24/24 1335     Admin Instructions:   Swallow whole; do not crush, split, or chew.           0814-Given       1027-Given       1335-Given           potassium chloride (K-DUR,KLOR-CON) ER tablet 40 mEq  Dose: 40 mEq  Freq: Every 4 Hours Route: PO  Start: 09/23/24 1316 End: 09/23/24 2115     Admin Instructions:   Do not crush or chew the capsules or tablets. The drug may not work as designed if the capsule or tablet is crushed or chewed. Swallow whole.  Swallow whole; do not crush, split, or chew.          1304-Given       1822-Given       2115-Given       2219-Canceled Entry            sodium chloride 0.9 % bolus 1,000 mL  Dose: 1,000 mL  Freq: Once Route: IV  Start: 09/23/24 1214 End: 09/23/24 1329          1212-New Bag       1329-Stopped            Discontinued Medications  Medications 09/19/24 09/20/24 09/21/24 09/22/24 09/23/24 09/24/24 09/25/24       acetaminophen (TYLENOL) tablet 650 mg  Dose: 650 mg  Freq: Every 4 Hours PRN Route: PO  PRN Reason: Mild Pain  Start: 09/23/24 1808 End: 09/25/24 1932     Admin Instructions:   If given for fever, use fever parameter: fever greater than 100.4 °F  Based on patient request - if ordered for moderate or severe pain, provider allows for administration of a medication prescribed for a lower pain scale.    Do not exceed 4 grams of acetaminophen in a 24 hr period. Max dose of 2gm for AST/ALT greater than 120 units/L.    If given for pain, use the following pain scale:   Mild Pain = Pain Score of 1-3, CPOT 1-2  Moderate Pain = Pain Score of 4-6, CPOT 3-4  Severe Pain = Pain Score of 7-10, CPOT 5-8          1823-Given        0825-Given           Or   acetaminophen (TYLENOL) 160 MG/5ML oral solution 650 mg  Dose: 650 mg  Freq: Every 4 Hours PRN Route: PO  PRN  "Reason: Mild Pain  Start: 09/23/24 1808 End: 09/25/24 1932     Admin Instructions:   If given for fever, use fever parameter: fever greater than 100.4 °F  Based on patient request - if ordered for moderate or severe pain, provider allows for administration of a medication prescribed for a lower pain scale.    Do not exceed 4 grams of acetaminophen in a 24 hr period. Max dose of 2gm for AST/ALT greater than 120 units/L.    If given for pain, use the following pain scale:   Mild Pain = Pain Score of 1-3, CPOT 1-2  Moderate Pain = Pain Score of 4-6, CPOT 3-4  Severe Pain = Pain Score of 7-10, CPOT 5-8          1823-Not Given:  See Alt        0825-Not Given:  See Alt           Or   acetaminophen (TYLENOL) suppository 650 mg  Dose: 650 mg  Freq: Every 4 Hours PRN Route: RE  PRN Reason: Mild Pain  Start: 09/23/24 1808 End: 09/25/24 1932     Admin Instructions:   If given for fever, use fever parameter: fever greater than 100.4 °F  Based on patient request - if ordered for moderate or severe pain, provider allows for administration of a medication prescribed for a lower pain scale.    Do not exceed 4 grams of acetaminophen in a 24 hr period. Max dose of 2gm for AST/ALT greater than 120 units/L.    If given for pain, use the following pain scale:   Mild Pain = Pain Score of 1-3, CPOT 1-2  Moderate Pain = Pain Score of 4-6, CPOT 3-4  Severe Pain = Pain Score of 7-10, CPOT 5-8          1823-Not Given:  See Alt        0825-Not Given:  See Alt           Calcium Replacement - Follow Nurse / BPA Driven Protocol  Freq: As Needed Route: XX  PRN Reason: Other  Start: 09/24/24 1353 End: 09/25/24 1932     Admin Instructions:   Open Order & Select \"[facility] Electrolyte Replacement Protocol Algorithm\" to View Details               carvedilol (COREG) tablet 12.5 mg  Dose: 12.5 mg  Freq: 2 Times Daily Route: PO  Start: 09/23/24 2100 End: 09/25/24 1932     Admin Instructions:   Hold for SBP less than 100, DBP less than 60, or heart rate less " "than 50. If a dose is held, please contact the provider.  Give with food.          2116-Given        0814-Given       2153-Given        0813-Given          cloNIDine (CATAPRES) tablet 0.1 mg  Dose: 0.1 mg  Freq: 2 Times Daily Route: PO  Start: 09/23/24 2100 End: 09/24/24 0659     Admin Instructions:   Hold for SBP less than 100, DBP less than 60, or heart rate less than 50. If a dose is held, please contact the provider.  Caution: Look alike/sound alike drug alert.          2115-Given            lactated ringers 980 mL with potassium chloride 40 mEq infusion  Rate: 125 mL/hr  Freq: Continuous Route: IV  Start: 09/24/24 0745 End: 09/24/24 1534           0838-New Bag           lisinopril (PRINIVIL,ZESTRIL) tablet 20 mg  Dose: 20 mg  Freq: Daily Route: PO  Start: 09/23/24 2100 End: 09/23/24 1912     Admin Instructions:   Hold for SBP less than 100, DBP less than 60.               Magnesium Standard Dose Replacement - Follow Nurse / BPA Driven Protocol  Freq: As Needed Route: XX  PRN Reason: Other  Start: 09/24/24 1353 End: 09/25/24 1932     Admin Instructions:   Open Order & Select \"[facility] Electrolyte Replacement Protocol Algorithm\" to View Details               nicotine (NICODERM CQ) 14 MG/24HR patch 1 patch  Dose: 1 patch  Freq: Every 24 Hours Scheduled Route: TD  Start: 09/23/24 1503 End: 09/25/24 1932     Admin Instructions:   Apply to clean, dry, nonhairy area of skin (typically upper arm or shoulder)  Dispose of nicotine replacement therapies and their wrappers in non-hazardous pharmaceutical waste or in regular trash.          2115-Medication Applied        0815-Medication Removed       0816-Medication Applied        0811-Medication Removed       0817-Medication Applied       1732 (Medication Removed) [C]          nitroglycerin (NITROSTAT) SL tablet 0.4 mg  Dose: 0.4 mg  Freq: Every 5 Minutes PRN Route: SL  PRN Reason: Chest Pain  PRN Comment: Only if SBP Greater Than 100  Start: 09/23/24 1807 End: 09/25/24 1932   " "  Admin Instructions:   If Pain Unrelieved After 3 Doses Notify MD  May administer up to 3 doses per episode.                ondansetron ODT (ZOFRAN-ODT) disintegrating tablet 4 mg  Dose: 4 mg  Freq: Every 6 Hours PRN Route: PO  PRN Reasons: Nausea,Vomiting  Start: 09/23/24 1808 End: 09/25/24 1932     Admin Instructions:   If BOTH ondansetron (ZOFRAN) and promethazine (PHENERGAN) are ordered use ondansetron first and THEN promethazine IF ondansetron is ineffective.  Place on tongue and allow to dissolve.               Or   ondansetron (ZOFRAN) injection 4 mg  Dose: 4 mg  Freq: Every 6 Hours PRN Route: IV  PRN Reasons: Nausea,Vomiting  Start: 09/23/24 1808 End: 09/25/24 1932     Admin Instructions:   If BOTH ondansetron (ZOFRAN) and promethazine (PHENERGAN) are ordered use ondansetron first and THEN promethazine IF ondansetron is ineffective.               Phosphorus Replacement - Follow Nurse / BPA Driven Protocol  Freq: As Needed Route: XX  PRN Reason: Other  Start: 09/24/24 1353 End: 09/25/24 1932     Admin Instructions:   Open Order & Select \"BHS Electrolyte Replacement Protocol Algorithm\" to View Details               potassium chloride (K-DUR,KLOR-CON) ER tablet 40 mEq  Dose: 40 mEq  Freq: Every 4 Hours Route: PO  Start: 09/24/24 0345 End: 09/24/24 0658     Admin Instructions:   Do not crush or chew the capsules or tablets. The drug may not work as designed if the capsule or tablet is crushed or chewed. Swallow whole.  Swallow whole; do not crush, split, or chew.           0349-Given           Potassium Replacement - Follow Nurse / BPA Driven Protocol  Freq: As Needed Route: XX  PRN Reason: Other  Start: 09/24/24 1353 End: 09/25/24 1932     Admin Instructions:   Open Order & Select \"BHS Electrolyte Replacement Protocol Algorithm\" to View Details               Potassium Replacement - Follow Nurse / BPA Driven Protocol  Freq: As Needed Route: XX  PRN Reason: Other  Start: 09/23/24 1158 End: 09/25/24 1932   " "  Admin Instructions:   Open Order & Select \"BHS Electrolyte Replacement Protocol Algorithm\" to View Details               sodium chloride 0.9 % flush 10 mL  Dose: 10 mL  Freq: As Needed Route: IV  PRN Reason: Line Care  Start: 09/23/24 1806 End: 09/25/24 1932               sodium chloride 0.9 % flush 10 mL  Dose: 10 mL  Freq: Every 12 Hours Scheduled Route: IV  Start: 09/23/24 2100 End: 09/25/24 1932          2116-Given        0814-Given       2154-Given        0813-Given          sodium chloride 0.9 % infusion  Rate: 75 mL/hr Dose: 75 mL/hr  Freq: Continuous Route: IV  Start: 09/23/24 1815 End: 09/24/24 0658          1826-New Bag            sodium chloride 0.9 % infusion 40 mL  Dose: 40 mL  Freq: As Needed Route: IV  PRN Reason: Line Care  Start: 09/23/24 1806 End: 09/25/24 1932     Admin Instructions:   Following administration of an IV intermittent medication, flush line with 40mL NS at 100mL/hr.               spironolactone (ALDACTONE) tablet 25 mg  Dose: 25 mg  Freq: Daily Route: PO  Start: 09/25/24 0930 End: 09/25/24 1932     Admin Instructions:   Hold for SBP less than 100, DBP less than 60.  Group 1 (Yellow) Hazardous Drug - See Handling Guide            1145-Given          terazosin (HYTRIN) capsule 1 mg  Dose: 1 mg  Freq: Nightly Route: PO  Start: 09/25/24 2100 End: 09/25/24 0832     Admin Instructions:   Hold for SBP less than 100, DBP less than 60.               valsartan (DIOVAN) tablet 160 mg  Dose: 160 mg  Freq: Every 24 Hours Scheduled Route: PO  Start: 09/24/24 1530 End: 09/25/24 0645     Admin Instructions:   Hold for SBP less than 100, DBP less than 60, or heart rate less than 50. If a dose is held, please contact the provider.           1618-Given           valsartan (DIOVAN) tablet 240 mg  Dose: 240 mg  Freq: Every 24 Hours Scheduled Route: PO  Start: 09/25/24 0900 End: 09/25/24 1932     Admin Instructions:   Hold for SBP less than 100, DBP less than 60, or heart rate less than 50. If a dose is " held, please contact the provider.            0813-Given                      Operative/Procedure Notes (all)    No notes of this type exist for this encounter.          Physician Progress Notes (all)        Pro Jordan MD at 24 0829              [facility] Progress Note      Patient Name: Vadim Elder  : 1970  MRN: [id number]  Primary Care Physician:  Shiva Gutierrez MD  Date of admission: 2024    Subjective     Interval History:     Overnight no event  Patient lying in bed comfortable offers no complaints  Patient feels a little bit anxious after not smoking since his hospital admission  Denies any chest pain or palpitations  Tolerating oral intake  No nausea or emesis    Urinary spontaneously    Review of Systems:   As noted above    Objective     Vitals:   Temp:  [98.2 °F (36.8 °C)-98.6 °F (37 °C)] 98.2 °F (36.8 °C)  Heart Rate:  [71-83] 71  Resp:  [16-20] 20  BP: (153-196)/() 180/109    Intake/Output Summary (Last 24 hours) at 2024 0829  Last data filed at 2024 1800  Gross per 24 hour   Intake 840 ml   Output 1100 ml   Net -260 ml       Physical Exam:    General Appearance: alert, oriented x 3, no acute distress   Skin: warm and dry  HEENT: oral mucosa normal, nonicteric sclera  Neck: supple, no JVD  Lungs: CTA  Heart: RRR, normal S1 and S2  Abdomen: soft, nontender, nondistended  : no palpable bladder  Extremities: no edema, cyanosis or clubbing  Neuro: normal speech and mental status     Scheduled Meds:     carvedilol, 12.5 mg, Oral, BID  nicotine, 1 patch, Transdermal, Q24H  sodium chloride, 10 mL, Intravenous, Q12H  terazosin, 1 mg, Oral, Nightly  valsartan, 240 mg, Oral, Q24H      IV Meds:          Results Reviewed:   I have personally reviewed the results from the time of this admission to 2024 08:29 EDT     Results from last 7 days   Lab Units 24  0505 24  1252 24  1148 24  0148 24  1554  09/23/24  1111   SODIUM mmol/L 130* 127*  --  124*   < > 119*   POTASSIUM mmol/L 3.7 4.1 4.0 3.4*   < > 2.7*   CHLORIDE mmol/L 97* 95*  --  90*   < > 82*   CO2 mmol/L 23.0 24.9  --  24.4   < > 27.3   BUN mg/dL 10 10  --  12   < > 14   CREATININE mg/dL 0.84 0.91  --  0.97   < > 1.61*   CALCIUM mg/dL 9.0 9.2  --  8.6   < > 8.6   BILIRUBIN mg/dL  --   --   --   --   --  0.6   ALK PHOS U/L  --   --   --   --   --  53   ALT (SGPT) U/L  --   --   --   --   --  20   AST (SGOT) U/L  --   --   --   --   --  27   GLUCOSE mg/dL 98 82  --  94   < > 117*    < > = values in this interval not displayed.       Estimated Creatinine Clearance: 109.6 mL/min (by C-G formula based on SCr of 0.84 mg/dL).    Results from last 7 days   Lab Units 09/24/24  0148 09/23/24  1554 09/23/24  1111   MAGNESIUM mg/dL  --   --  2.2   PHOSPHORUS mg/dL 3.1 3.4 3.3       Results from last 7 days   Lab Units 09/23/24  1111   URIC ACID mg/dL 4.6       Results from last 7 days   Lab Units 09/25/24  0505 09/23/24  1111   WBC 10*3/mm3 9.75 9.94   HEMOGLOBIN g/dL 15.1 15.2   PLATELETS 10*3/mm3 215 226             Assessment / Plan     ASSESSMENT:    -Acute hypovolemic hyponatremia likely secondary to chlorthalidone accompanied with increased GI losses and increased free water intake , initial urine sodium of 22 after receiving 1 L of NS.  Her blood pressure has dramatically improved waiting for repeat renal panel, urine osmolarity, and serum osmolarity.  Likely will continue gentle hydration,  -Acute kidney injury likely secondary to prerenal state from increased GI losses accompanied with chlorthalidone and lisinopril affecting renal autoregulation associated with chronic use of NSAIDs currently managed with IV fluids.   -Chronic hypokalemia.  Has been between 3.1 and 3.3 for the last 4 years the patient has been on chlorthalidone that could be a contributing factor.  But concerning for hyperaldosteronism state.  Patient could benefit from K sparing agent  during admission .  Continue oral KCl replacement magnesium levels stable at 2.2,  -Diarrhea with increased GI losses on replacement  -Hypertension initially hypotensive in the ER holding antihypertensive medications will resume gradually  -Tobacco abuse counseling provided in [place]       PLAN:  Patient valsartan was increased from 160 to  320 mg and started  on spironolactone for his hypokalemia   His renal function and  electrolytes has dramatically improved  Patient can safely be discharged to continue patient care I will see the patient in a week in renal clinic to further adjust his current antihypertensive regimen.  Patient will be contacted by phone for his appointment    Thank you for involving us in the care of Vadim Elder.  Please feel free to call with any questions.    Encounter held in [place] patient verbalized understanding    Pro Jordan MD  24  08:29 EDT    Nephrology [facility]  [phone]    Please note that portions of this note were completed with a voice recognition program.      Electronically signed by rPo Jordan MD at 24 0840       Pro Jordan MD at 24 1528              Nephrology [facility] Progress Note      Patient Name: Vadim Elder  : 1970  MRN: [id number]  Primary Care Physician:  Shiva Gutierrez MD  Date of admission: 2024    Subjective     Interval History:     Patient lying in bed feeling comfortable   Denies any chest pain or palpitations  Tolerating oral intake  No nausea or emesis    Urinary spontaneously    Review of Systems:   As noted above    Objective     Vitals:   Temp:  [97.3 °F (36.3 °C)-98.2 °F (36.8 °C)] 98.2 °F (36.8 °C)  Heart Rate:  [70-79] 79  Resp:  [16] 16  BP: (104-172)/(48-96) 161/82    Intake/Output Summary (Last 24 hours) at 2024 1528  Last data filed at 2024 1403  Gross per 24 hour   Intake 1040 ml   Output 2250 ml   Net -1210 ml       Physical Exam:     General Appearance: alert, oriented x 3, no acute distress   Skin: warm and dry  HEENT: oral mucosa normal, nonicteric sclera  Neck: supple, no JVD  Lungs: CTA  Heart: RRR, normal S1 and S2  Abdomen: soft, nontender, nondistended  : no palpable bladder  Extremities: no edema, cyanosis or clubbing  Neuro: normal speech and mental status     Scheduled Meds:     carvedilol, 12.5 mg, Oral, BID  nicotine, 1 patch, Transdermal, Q24H  sodium chloride, 10 mL, Intravenous, Q12H  valsartan, 160 mg, Oral, Q24H      IV Meds:   lactated ringers 980 mL with potassium chloride 40 mEq infusion, , Last Rate: 125 mL/hr at 09/24/24 0838        Results Reviewed:   I have personally reviewed the results from the time of this admission to 9/24/2024 15:28 EDT     Results from last 7 days   Lab Units 09/24/24  1252 09/24/24  1148 09/24/24  0148 09/23/24  1554 09/23/24  1111   SODIUM mmol/L 127*  --  124* 122* 119*   POTASSIUM mmol/L 4.1 4.0 3.4* 3.0* 2.7*   CHLORIDE mmol/L 95*  --  90* 85* 82*   CO2 mmol/L 24.9  --  24.4 26.0 27.3   BUN mg/dL 10  --  12 14 14   CREATININE mg/dL 0.91  --  0.97 1.21 1.61*   CALCIUM mg/dL 9.2  --  8.6 9.0 8.6   BILIRUBIN mg/dL  --   --   --   --  0.6   ALK PHOS U/L  --   --   --   --  53   ALT (SGPT) U/L  --   --   --   --  20   AST (SGOT) U/L  --   --   --   --  27   GLUCOSE mg/dL 82  --  94 105* 117*       Estimated Creatinine Clearance: 101.2 mL/min (by C-G formula based on SCr of 0.91 mg/dL).    Results from last 7 days   Lab Units 09/24/24  0148 09/23/24  1554 09/23/24  1111   MAGNESIUM mg/dL  --   --  2.2   PHOSPHORUS mg/dL 3.1 3.4 3.3       Results from last 7 days   Lab Units 09/23/24  1111   URIC ACID mg/dL 4.6       Results from last 7 days   Lab Units 09/23/24  1111   WBC 10*3/mm3 9.94   HEMOGLOBIN g/dL 15.2   PLATELETS 10*3/mm3 226             Assessment / Plan     ASSESSMENT:    -Acute hypovolemic hyponatremia likely secondary to chlorthalidone accompanied with increased GI losses and  increased free water intake , initial urine sodium of 22 after receiving 1 L of NS.  Her blood pressure has dramatically improved waiting for repeat renal panel, urine osmolarity, and serum osmolarity.  Likely will continue gentle hydration,  -Acute kidney injury likely secondary to prerenal state from increased GI losses accompanied with chlorthalidone and lisinopril affecting renal autoregulation associated with chronic use of NSAIDs currently managed with IV fluids.   -Chronic hypokalemia.  Has been between 3.1 and 3.3 for the last 4 years the patient has been on chlorthalidone that could be a contributing factor.  But concerning for hyperaldosteronism state.  Patient could benefit from K sparing agent during admission .  Continue oral KCl replacement magnesium levels stable at 2.2,  -Diarrhea with increased GI losses on replacement  -Hypertension initially hypotensive in the ER holding antihypertensive medications will resume gradually  -Tobacco abuse counseling provided in [place]       PLAN:  Patient had responded to IV fluid challenge his sodium is gradually improving as well as his hemodynamics  Will restart patient on antihypertensive medications instead of lisinopril will add an increased dose of valsartan.  Will suggest to discontinue chlorthalidone.   Upon discharge patient can be followed closely in renal clinic for further titration of his antihypertensive medications  Will continue surveillance labs    Discussed with Dr Carlton     Thank you for involving us in the care of Vadim Elder.  Please feel free to call with any questions.    Encounter held in [place] patient verbalized understanding    Pro Jordan MD  09/24/24  15:28 EDT    [facility]  [phone]    Please note that portions of this note were completed with a voice recognition program.      Electronically signed by Pro Jordan MD at 09/24/24 5097       Jung Carlton DO at 09/24/24 8836               Name: Vadim Elder ADMIT: 2024   : 1970  PCP: Shiva Gutierrez MD    MRN: [id number] LOS: 1 days   AGE/SEX: 54 y.o. male  ROOM: [facility]     Subjective   Subjective   Patient seen and examined this morning.  Hospital day 1.   services used at bedside, patient endorses some fatigue, ongoing intermittent dizziness, otherwise no acute complaints.      Objective   Objective   Vital Signs  Temp:  [97.3 °F (36.3 °C)-98.2 °F (36.8 °C)] 98.2 °F (36.8 °C)  Heart Rate:  [70-79] 79  Resp:  [16] 16  BP: (104-172)/(48-96) 161/82  SpO2:  [94 %-100 %] 99 %  on   ;   Device (Oxygen Therapy): room air  Body mass index is 31.74 kg/m².  Physical Exam  Vitals and nursing note reviewed.   Constitutional:       General: He is awake. He is not in acute distress.  HENT:      Head: Atraumatic.   Cardiovascular:      Rate and Rhythm: Normal rate and regular rhythm.      Pulses: Normal pulses.      Heart sounds: Normal heart sounds.   Pulmonary:      Effort: Pulmonary effort is normal. No respiratory distress.      Breath sounds: Normal breath sounds.   Abdominal:      Palpations: Abdomen is soft.      Tenderness: There is no abdominal tenderness.   Musculoskeletal:      Right lower leg: No edema.      Left lower leg: No edema.   Skin:     General: Skin is warm and dry.   Neurological:      Mental Status: He is alert.   Psychiatric:         Behavior: Behavior is cooperative.       Results Review     I reviewed the patient's new clinical results.  Results from last 7 days   Lab Units 24  1111   WBC 10*3/mm3 9.94   HEMOGLOBIN g/dL 15.2   PLATELETS 10*3/mm3 226     Results from last 7 days   Lab Units 24  1148 24  0148 24  1554 24  1111   SODIUM mmol/L  --  124* 122* 119*   POTASSIUM mmol/L 4.0 3.4* 3.0* 2.7*   CHLORIDE mmol/L  --  90* 85* 82*   CO2 mmol/L  --  24.4 26.0 27.3   BUN mg/dL  --  12 14 14   CREATININE mg/dL  --  0.97 1.21 1.61*   GLUCOSE mg/dL  --  94 105* 117*   EGFR  "mL/min/1.73  --  91.6 70.3 49.9*     Results from last 7 days   Lab Units 09/24/24  0148 09/23/24  1554 09/23/24  1111   ALBUMIN g/dL 3.6 3.9 3.6   BILIRUBIN mg/dL  --   --  0.6   ALK PHOS U/L  --   --  53   AST (SGOT) U/L  --   --  27   ALT (SGPT) U/L  --   --  20     Results from last 7 days   Lab Units 09/24/24  0148 09/23/24  1554 09/23/24  1111   CALCIUM mg/dL 8.6 9.0 8.6   ALBUMIN g/dL 3.6 3.9 3.6   MAGNESIUM mg/dL  --   --  2.2   PHOSPHORUS mg/dL 3.1 3.4 3.3       No results found for: \"HGBA1C\", \"POCGLU\"    CT Abdomen Pelvis With Contrast    Result Date: 9/23/2024   1. No acute findings identified in the abdomen or pelvis. 2. Moderate bladder distention. Clinical correlation. 3. Nonobstructing nephrolithiasis.  This report was finalized on 9/23/2024 1:19 PM by Dr. Juan Vasquez M.D on Workstation: [id number]      CT Head Without Contrast    Result Date: 9/23/2024  No acute process is identified. Further evaluation could be performed with a MRI examination of the brain as indicated.      Radiation dose reduction techniques were utilized, including automated exposure modulation based on body size.  This report was finalized on 9/23/2024 1:00 PM by Dr. Fady Merchant M.D on Workstation: [id number]       I have personally reviewed all medications:  Scheduled Medications  carvedilol, 12.5 mg, Oral, BID  nicotine, 1 patch, Transdermal, Q24H  sodium chloride, 10 mL, Intravenous, Q12H    Infusions  lactated ringers 980 mL with potassium chloride 40 mEq infusion, , Last Rate: 125 mL/hr at 09/24/24 0838    Diet  Diet: Regular/House; Fluid Consistency: Thin (IDDSI 0)    I have personally reviewed:  [x]  Laboratory   []  Microbiology   [x]  Radiology   [x]  EKG/Telemetry  []  Cardiology/Vascular   []  Pathology    []  Records      Assessment/Plan     Active Hospital Problems    Diagnosis  POA    **Hyponatremia [E87.1]  Yes    Acute kidney injury [N17.9]  Yes    Hypokalemia [E87.6]  Yes    Elevated troponin " [R79.89]  Yes    Obesity (BMI 30-39.9) [E66.9]  Yes    Essential hypertension [I10]  Yes    Nephrolithiasis [N20.0]  Yes    Dizziness [R42]  Yes    Dehydration [E86.0]  Yes      Resolved Hospital Problems   No resolved problems to display.       54 y.o. male admitted with Hyponatremia.    Hyponatremia  - Likely hypovolemic, urine studies, TSH, uric acid reviewed. Patient receiving IV fluids, values remain low on most recent labs, slightly improved from prior.  - Nephrology consulted and following. Will continue to follow their plans/recommendations. Greatly appreciate their help.  - Continue treatments as ordered for now.  - Order repeat BMP in AM for reassessment.    Acute kidney injury  Dehydration  - Likely secondary to volume depletion from decreased intake, possible GI losses given endorsement of loose stools, coupled with possible side effects of medications. He is receiving IV fluids, values are improving. No evidence of urinary retention, but will monitor with bladder scans per acute urinary retention protocol.  - Continue current treatments, repeat labs in AM.    Dizziness  - Likely due to volume depletion, CT head negative. Check orthostatics.    Hypokalemia  - K low on most recent labs; Will replete via electrolyte protocol. Follow up repeat labs to guide ongoing management decisions. Replete PRN per protocol. Continue to monitor.  - Check magnesium level.    Hypertension  - Blood pressure appears stable and acceptable acutely at this time. No indications to warrant acute changes/intervention at this time.  - Continue current medications as prescribed. Trend BP to guide ongoing management decisions.    Elevated troponin  - Noted on arrival, mild increase, no evidence of ACS, likely 2/2 acute kidney injury.  - Monitor on telemetry.    Nephrolithiasis  - Noted on imaging, monitor for signs of urinary retention.    Obesity  - BMI 31.74 kg/m2. Complicating all medical problems.      SCDs for DVT  prophylaxis.  Full code.  Discussed with patient, nursing staff, consulting provider, CCP, and care team on multidisciplinary rounds.  Anticipate discharge home later this week.  Expected Discharge Date: 2024; Expected Discharge Time:       Jung Carlton DO  [facility]  24        Electronically signed by Jung Carlton DO at 24 1400          Consult Notes (all)        Yan Vazquez MD at 24 1120          Date of Hospital Visit: 2024  Date of consult: 2024  Encounter Provider: Yan Vazquez MD  Place of Service: [facility] CARDIOLOGY  Patient Name: Vadim Elder  :1970  Referral Provider: No ref. provider found    Chief complaint: Lightheadedness    Reason for consult: Abnormal EKG/elevated troponin  I have used a  for history  History of Present Illness  Mr. Elder is a pleasant 54 years old gentleman who was admitted on 2024 with diagnosis of JEFFREY after he presented with symptoms of lightheadedness.  He was at work when he felt lightheaded and EMS found out he was hypotensive.  Initial ER workup revealed hyponatremia/hypokalemia, JEFFREY.  He was treated with IV fluids and nephrology followed patient.  Past medical history significant for hypertension on carvedilol, chlorthalidone, clonidine, lisinopril and on potassium supplement for chronic hypokalemia  Noted to have abnormal EKG with T wave inversion on lateral leads unchanged on 2024 and 2024.  Patient denies any chest pain.    History reviewed. No pertinent past medical history.    History reviewed. No pertinent surgical history.    Medications Prior to Admission   Medication Sig Dispense Refill Last Dose    carvedilol (COREG) 12.5 MG tablet Take 1 tablet by mouth 2 (Two) Times a Day.       chlorthalidone (HYGROTON) 25 MG tablet Take 1 tablet by mouth Daily.       cloNIDine (CATAPRES) 0.1 MG tablet Take 1 tablet by mouth 2  (Two) Times a Day.       lisinopril (PRINIVIL,ZESTRIL) 20 MG tablet Take 1 tablet by mouth Daily.       potassium chloride ER (K-TAB) 20 MEQ tablet controlled-release ER tablet Take 1 tablet by mouth 2 (Two) Times a Day.          Current Meds  Scheduled Meds:carvedilol, 12.5 mg, Oral, BID  nicotine, 1 patch, Transdermal, Q24H  sodium chloride, 10 mL, Intravenous, Q12H  spironolactone, 25 mg, Oral, Daily  valsartan, 240 mg, Oral, Q24H      Continuous Infusions:   PRN Meds:.  acetaminophen **OR** acetaminophen **OR** acetaminophen    Calcium Replacement - Follow Nurse / BPA Driven Protocol    Magnesium Standard Dose Replacement - Follow Nurse / BPA Driven Protocol    nitroglycerin    ondansetron ODT **OR** ondansetron    Phosphorus Replacement - Follow Nurse / BPA Driven Protocol    Potassium Replacement - Follow Nurse / BPA Driven Protocol    Potassium Replacement - Follow Nurse / BPA Driven Protocol    sodium chloride    sodium chloride    Allergies as of 09/23/2024    (No Known Allergies)       Social History     Socioeconomic History    Marital status:    Tobacco Use    Smoking status: Every Day     Current packs/day: 0.50     Average packs/day: 0.5 packs/day for 52.7 years (26.4 ttl pk-yrs)     Types: Cigarettes     Start date: 1972    Smokeless tobacco: Never   Vaping Use    Vaping status: Never Used   Substance and Sexual Activity    Alcohol use: Yes     Alcohol/week: 12.0 standard drinks of alcohol     Types: 12 Cans of beer per week     Comment: 2 beer/day    Drug use: Never       History reviewed. No pertinent family history.    REVIEW OF SYSTEMS:   All systems reviewed and pertinent positives include in HPI otherwise negative review of systems.       Objective:   Temp:  [98.2 °F (36.8 °C)-98.6 °F (37 °C)] 98.2 °F (36.8 °C)  Heart Rate:  [71-83] 71  Resp:  [16-20] 20  BP: (153-196)/() 180/109  Body mass index is 31.74 kg/m².  Flowsheet Rows      Flowsheet Row First Filed Value   Admission Height  "172.7 cm (68\") Documented at 09/23/2024 1300   Admission Weight 92.5 kg (204 lb) Documented at 09/23/2024 1259          Vitals:    09/25/24 0730   BP: (!) 180/109   Pulse: 71   Resp: 20   Temp:    SpO2: 98%       General Appearance:    Alert, cooperative, in no acute distress   Head:    Normocephalic, without obvious abnormality, atraumatic   Eyes:            Lids and lashes normal, conjunctivae and sclerae normal, no   icterus, no pallor, corneas clear, PERRLA   Ears:    Ears appear intact with no abnormalities noted   Throat:   No oral lesions, no thrush, oral mucosa moist   Neck:   No adenopathy, supple, trachea midline, no thyromegaly, no   carotid bruit, no JVD   Back:     No kyphosis present, no scoliosis present, no skin lesions, erythema or scars, no tenderness to percussion or palpation, range of motion normal   Lungs:     Clear to auscultation,respirations regular, even and unlabored    Heart:    Regular rhythm and normal rate, normal S1 and S2, no murmur, no gallop, no rub, no click   Chest Wall:    No abnormalities observed   Abdomen:     Normal bowel sounds, no masses, no organomegaly, soft nontender, nondistended, no guarding, no rebound  tenderness   Extremities:   Moves all extremities well, no edema, no cyanosis, no redness   Pulses:   Pulses palpable and equal bilaterally. Normal radial, carotid, femoral, dorsalis pedis and posterior tibial pulses bilaterally. Normal abdominal aorta   Skin:  Neurology:   Psychiatric:   No bleeding, bruising or rash   Normal speech and cranial nerve exam, no focal deficit   Alert and oriented x 3, normal mood and affect             Review of Data:      Results from last 7 days   Lab Units 09/25/24  0505 09/23/24  1554 09/23/24  1111   SODIUM mmol/L 130*   < > 119*   POTASSIUM mmol/L 3.7   < > 2.7*   CHLORIDE mmol/L 97*   < > 82*   CO2 mmol/L 23.0   < > 27.3   BUN mg/dL 10   < > 14   CREATININE mg/dL 0.84   < > 1.61*   CALCIUM mg/dL 9.0   < > 8.6   BILIRUBIN mg/dL  " --   --  0.6   ALK PHOS U/L  --   --  53   ALT (SGPT) U/L  --   --  20   AST (SGOT) U/L  --   --  27   GLUCOSE mg/dL 98   < > 117*    < > = values in this interval not displayed.     Results from last 7 days   Lab Units 09/23/24  1111   HSTROP T ng/L 23*     @LABRCNTbnp@  Results from last 7 days   Lab Units 09/25/24  0505 09/23/24  1111   WBC 10*3/mm3 9.75 9.94   HEMOGLOBIN g/dL 15.1 15.2   HEMATOCRIT % 43.3 43.8   PLATELETS 10*3/mm3 215 226         Results from last 7 days   Lab Units 09/23/24  1111   MAGNESIUM mg/dL 2.2     @LABRCNTIP(chol,trig,hdl,ldl)    I personally viewed and interpreted the patient's EKG/Telemetry data  )   Hyponatremia    Acute kidney injury    Hypokalemia    Elevated troponin    Obesity (BMI 30-39.9)    Essential hypertension    Nephrolithiasis    Dizziness    Dehydration    Abnormal EKG        Assessment and Plan:  Patient admitted with hypotension and JEFFREY-resolved  Abnormal EKG with T wave inversion noted in lateral leads-unchanged on 2 EKGs done day apart.  He denies any rest or exertional chest pain.  No breathing problems.  No prior known cardiovascular illness other than hypertension for which he takes several medications.  Telemetry reviewed without significant abnormalities.  Borderline elevated flat troponin  Daily tobacco and occasional alcohol use  No family history of known cardiovascular illness    Echocardiogram  Check lipid panel, A1c  Further recommendation and May adjust BP meds depending on echocardiogram finding.      Addendum: Echocardiogram with moderate LVH, grade 1 diastolic dysfunction otherwise normal ventricular ejection fraction.  Need to take blood pressure medications consistently.  Recommend outpatient sleep study.  I will see him in office in 3 months.    Okay to DC patient home today.    I have discussed patient with Dr Bianka Vazquez MD  09/25/24  11:36 EDT.      Time spent in reviewing chart, discussion and examination:                       Electronically signed by aYn Vazquez MD at 24 1436       Pro Jordan MD at 24 1507        Consult Orders    1. Inpatient Nephrology Consult [[id number]] ordered by Pro Jordan MD at 24 1341    2. Nephrology (on -call MD unless specified) [[id number]] ordered by Andrew Magdaleno MD at 24 1336                   [facility] Consult Note      Patient Name: Vadim Elder  : 1970  MRN: [id number]  Primary Care Physician:  Provider, No Known  Referring Physician: No ref. provider found  Date of admission: 2024    Subjective     Reason for Consult: Acute kidney injury with hyponatremia    HPI:   Vadim Elder is a 54 y.o. male originally from [employer] [place] who works in air conditioning, active smoker 1 pack a day for many years, hypertension currently controlled on carvedilol, lisinopril, clonidine and chlorthalidone with chronic hypokalemia replacement followed by PCP  ( his potassium has been chronically low for the last 4 years  3.1 to 3.3 ).  Diffuse osteoarthritis with chronic Advil and naproxen use usually 3-4 times a week     The patient states that he has been drinking a fair amount of fluids due to onset of loose bowel movements and lightheadedness he has been taking his medication instructed and this morning had an episode of lightheadedness EMS was called and brought to the emergency department where he was found hypotensive initial workup found to have acute hyponatremia in the 119.  Patient has initially managed with 1 L of NS .    Nephrology consultation requested for the management    Encounter held in [place]    Review of Systems:   14 point review of systems is otherwise negative except for mentioned above on HPI    Personal History     History reviewed. No pertinent past medical history.    History reviewed. No pertinent surgical history.    Family History: family history is not on file.    Social  History:  reports current alcohol use of about 12.0 standard drinks of alcohol per week.    Home Medications:  Prior to Admission medications    Medication Sig Start Date End Date Taking? Authorizing Provider   carvedilol (COREG) 12.5 MG tablet Take 1 tablet by mouth 2 (Two) Times a Day.   Yes Suresh Gomez MD   chlorthalidone (HYGROTON) 25 MG tablet Take 1 tablet by mouth Daily.   Yes Suresh Gomez MD   cloNIDine (CATAPRES) 0.1 MG tablet Take 1 tablet by mouth 2 (Two) Times a Day.   Yes Suresh Gomez MD   lisinopril (PRINIVIL,ZESTRIL) 20 MG tablet Take 1 tablet by mouth Daily.   Yes Suresh Gomez MD   potassium chloride ER (K-TAB) 20 MEQ tablet controlled-release ER tablet Take 1 tablet by mouth 2 (Two) Times a Day.   Yes Suresh Gomez MD       Allergies:  No Known Allergies    Objective     Vitals:   Temp:  [98.5 °F (36.9 °C)] 98.5 °F (36.9 °C)  Heart Rate:  [66-78] 78  Resp:  [16] 16  BP: (105-155)/(55-94) 155/94    Intake/Output Summary (Last 24 hours) at 9/23/2024 1507  Last data filed at 9/23/2024 1443  Gross per 24 hour   Intake 1700 ml   Output 400 ml   Net 1300 ml       Physical Exam:   Constitutional: Awake, alert, no acute distress.  HEENT: Sclera anicteric, no conjunctival injection  Neck: Supple, no thyromegaly, no lymphadenopathy, trachea at midline, no JVD  Respiratory: Clear to auscultation bilaterally, nonlabored respiration  Cardiovascular: RRR, no murmurs, no rubs or gallops, no carotid bruit  Gastrointestinal: Positive bowel sounds, abdomen is soft, nontender and nondistended  : No palpable bladder  Musculoskeletal: No edema, no clubbing or cyanosis  Psychiatric: Appropriate affect, cooperative  Neurologic: Oriented x3, moving all extremities, normal speech and mental status  Skin: Warm and dry       Scheduled Meds:     nicotine, 1 patch, Transdermal, Q24H  potassium chloride ER, 40 mEq, Oral, Q4H      IV Meds:        Results Reviewed:   I have personally  reviewed the results from the time of this admission to 9/23/2024 15:07 EDT     Lab Results   Component Value Date    GLUCOSE 117 (H) 09/23/2024    CALCIUM 8.6 09/23/2024     (C) 09/23/2024    K 2.7 (L) 09/23/2024    CO2 27.3 09/23/2024    CL 82 (L) 09/23/2024    BUN 14 09/23/2024    CREATININE 1.61 (H) 09/23/2024    EGFRIFAFRI >60 08/31/2022    BCR 8.7 09/23/2024    ANIONGAP 9.7 09/23/2024      Lab Results   Component Value Date    MG 2.2 09/23/2024    PHOS 3.3 09/23/2024    ALBUMIN 3.6 09/23/2024           Assessment / Plan       Hyponatremia      ASSESSMENT:    -Acute hypovolemic hyponatremia likely secondary to chlorthalidone accompanied with increased GI losses and increased free water intake , initial urine sodium of 22 after receiving 1 L of NS.  Her blood pressure has dramatically improved waiting for repeat renal panel, urine osmolarity, and serum osmolarity.  Likely will continue gentle hydration,  -Acute kidney injury likely secondary to prerenal state from increased GI losses accompanied with chlorthalidone and lisinopril affecting renal autoregulation associated with chronic use of NSAIDs currently managed with IV fluids.   -Chronic hypokalemia.  Has been between 3.1 and 3.3 for the last 4 years the patient has been on chlorthalidone that could be a contributing factor.  But concerning for hyperaldosteronism state.  Patient could benefit from K sparing agent during admission .  Continue oral KCl replacement magnesium levels stable at 2.2,  -Diarrhea with increased GI losses on replacement  -Hypertension initially hypotensive in the ER holding antihypertensive medications will resume gradually  -Tobacco abuse counseling provided in [place]    PLAN:  -Awaiting repeat renal panel and a urine studies to determine the course of action to follow likely continue diuretics and continue KCl replacement  -Continue surveillance labs    Encounter held in [place]    Thank you for involving us in the care of  Vadim Elder.  Please feel free to call with any questions.    Pro Jordan MD  09/23/24  15:07 EDT    [facility]  [phone]      Please note that portions of this note were completed with a voice recognition program.      Electronically signed by Pro Jordan MD at 09/23/24 1610